# Patient Record
Sex: MALE | Race: WHITE | NOT HISPANIC OR LATINO | Employment: FULL TIME | URBAN - METROPOLITAN AREA
[De-identification: names, ages, dates, MRNs, and addresses within clinical notes are randomized per-mention and may not be internally consistent; named-entity substitution may affect disease eponyms.]

---

## 2018-01-08 ENCOUNTER — TRANSCRIBE ORDERS (OUTPATIENT)
Dept: LAB | Facility: HOSPITAL | Age: 58
End: 2018-01-08

## 2018-01-08 ENCOUNTER — APPOINTMENT (OUTPATIENT)
Dept: LAB | Facility: HOSPITAL | Age: 58
End: 2018-01-08

## 2018-01-08 DIAGNOSIS — R53.83 OTHER FATIGUE: Primary | ICD-10-CM

## 2018-01-08 DIAGNOSIS — J11.1 FLU: ICD-10-CM

## 2018-01-08 LAB
FLUAV SUBTYP SPEC NAA+PROBE: DETECTED
FLUBV RNA ISLT QL NAA+PROBE: NOT DETECTED

## 2018-01-08 PROCEDURE — 87502 INFLUENZA DNA AMP PROBE: CPT | Performed by: PHYSICIAN ASSISTANT

## 2018-07-02 ENCOUNTER — OFFICE VISIT (OUTPATIENT)
Dept: FAMILY MEDICINE CLINIC | Facility: CLINIC | Age: 58
End: 2018-07-02

## 2018-07-02 VITALS
SYSTOLIC BLOOD PRESSURE: 128 MMHG | RESPIRATION RATE: 18 BRPM | HEIGHT: 74 IN | WEIGHT: 186.4 LBS | HEART RATE: 78 BPM | BODY MASS INDEX: 23.92 KG/M2 | DIASTOLIC BLOOD PRESSURE: 72 MMHG | OXYGEN SATURATION: 98 % | TEMPERATURE: 97.7 F

## 2018-07-02 DIAGNOSIS — Z86.39 H/O DIABETES MELLITUS: ICD-10-CM

## 2018-07-02 DIAGNOSIS — I10 ESSENTIAL HYPERTENSION: Primary | ICD-10-CM

## 2018-07-02 LAB
GLUCOSE BLDC GLUCOMTR-MCNC: 156 MG/DL (ref 70–130)
HBA1C MFR BLD: 5.6 %

## 2018-07-02 PROCEDURE — 82962 GLUCOSE BLOOD TEST: CPT | Performed by: FAMILY MEDICINE

## 2018-07-02 PROCEDURE — 99203 OFFICE O/P NEW LOW 30 MIN: CPT | Performed by: FAMILY MEDICINE

## 2018-07-02 PROCEDURE — 83036 HEMOGLOBIN GLYCOSYLATED A1C: CPT | Performed by: FAMILY MEDICINE

## 2018-07-02 RX ORDER — MELATONIN
1000 DAILY
COMMUNITY

## 2018-07-02 NOTE — PROGRESS NOTES
Riaz Luevano presents today to establish care.    Chief Complaint   Patient presents with   • Establish Care        HPI     HYPERTENSION:  Took BP meds for awhile  over a year ago and it came down. Stressful day so far. Amlodipine in past. No HA, CP, vision changes.     Diabetes:  Diagnosed 1 year ago. At physical A1c 6.7. Repeat 5.7 6 months later. Rarely eats white potatoes, limits breads mainly whoel grains, not a lot of corn and starchy vegetables. Cut back on sweets and desserts. Started walking, but not much lately. Adopted as infant, found birth mother known h/o diabetes.    Saw dermatology For skin check.  He had a place frozen on his head.  He is also been prescribed a cream to help with other lesions on his face.    Review of Systems   Constitutional: Negative.    HENT: Negative.    Eyes: Negative.  Negative for visual disturbance.   Respiratory: Negative.    Cardiovascular: Negative.  Negative for chest pain.   Gastrointestinal: Negative.    Endocrine: Negative.    Genitourinary: Negative.    Musculoskeletal: Positive for arthralgias and joint swelling.   Skin: Negative.    Neurological: Negative.    Hematological: Negative.    Psychiatric/Behavioral: Negative.         Past Medical History:   Diagnosis Date   • Arthritis    • Diabetes mellitus    • Fractures     ankle   • Hypertension    • Kidney stone         Past Surgical History:   Procedure Laterality Date   • ANKLE SURGERY      Right   • KIDNEY STONE SURGERY  2012        Family History   Problem Relation Age of Onset   • Adopted: Yes   • Diabetes Mother    • Diabetes Other         Social History     Social History   • Marital status:      Spouse name: N/A   • Number of children: N/A   • Years of education: N/A     Occupational History   •       Social History Main Topics   • Smoking status: Never Smoker   • Smokeless tobacco: Never Used   • Alcohol use No   • Drug use: No   • Sexual activity: Defer     Other Topics Concern   • Not  "on file     Social History Narrative   • No narrative on file        Current Outpatient Prescriptions   Medication Sig Dispense Refill   • Ascorbic Acid (VITAMIN C ADULT GUMMIES PO) Take 1 tablet by mouth Daily.     • cholecalciferol (VITAMIN D3) 1000 units tablet Take 4 Units by mouth Daily.       No current facility-administered medications for this visit.        No Known Allergies     Visit Vitals  /98 (BP Location: Left arm, Patient Position: Sitting)   Pulse 78   Temp 97.7 °F (36.5 °C) (Temporal Artery )   Resp 18   Ht 187 cm (73.62\")   Wt 84.6 kg (186 lb 6.4 oz)   SpO2 98%   BMI 24.18 kg/m²      Visit Vitals  /72   Pulse 78   Temp 97.7 °F (36.5 °C) (Temporal Artery )   Resp 18   Ht 187 cm (73.62\")   Wt 84.6 kg (186 lb 6.4 oz)   SpO2 98%   BMI 24.18 kg/m²         Physical Exam   Constitutional: He is oriented to person, place, and time. No distress.   HENT:   Right Ear: Hearing, tympanic membrane and ear canal normal.   Left Ear: Hearing, tympanic membrane and ear canal normal.   Mouth/Throat: Oropharynx is clear and moist.   Eyes:   Wears glasses   Neck: No thyromegaly present.   Cardiovascular: Normal rate, regular rhythm and intact distal pulses.    No murmur heard.  Pulmonary/Chest: Effort normal and breath sounds normal.   Abdominal: Soft. Bowel sounds are normal. There is no hepatosplenomegaly. There is no tenderness.   Musculoskeletal: He exhibits no edema.   Lymphadenopathy:     He has no cervical adenopathy.   Neurological: He is alert and oriented to person, place, and time.   Skin: Skin is warm and dry. No rash noted.   Cherry angiomas torso   Psychiatric: He has a normal mood and affect. His behavior is normal. Judgment and thought content normal.   Vitals reviewed.     Results for orders placed or performed in visit on 07/02/18   POCT Glucose   Result Value Ref Range    Glucose 156 (A) 70 - 130 mg/dL   POC Glycosylated Hemoglobin (Hb A1C)   Result Value Ref Range    Hemoglobin A1C 5.6 % "            Riaz was seen today for establish care.    Diagnoses and all orders for this visit:      Essential hypertension  Blood pressure stable on recheck.  No need for medications at this time.  H/O diabetes mellitus  -     POCT Glucose  -     POC Glycosylated Hemoglobin (Hb A1C)  Continue diet and lifestyle modification.  No need for medications for diabetes at this time.

## 2018-07-18 ENCOUNTER — LAB (OUTPATIENT)
Dept: LAB | Facility: HOSPITAL | Age: 58
End: 2018-07-18

## 2018-07-18 ENCOUNTER — OFFICE VISIT (OUTPATIENT)
Dept: FAMILY MEDICINE CLINIC | Facility: CLINIC | Age: 58
End: 2018-07-18

## 2018-07-18 VITALS
OXYGEN SATURATION: 96 % | SYSTOLIC BLOOD PRESSURE: 108 MMHG | RESPIRATION RATE: 18 BRPM | DIASTOLIC BLOOD PRESSURE: 60 MMHG | HEART RATE: 80 BPM | TEMPERATURE: 97.7 F | BODY MASS INDEX: 24 KG/M2 | WEIGHT: 187 LBS | HEIGHT: 74 IN

## 2018-07-18 DIAGNOSIS — Z13.220 SCREENING, LIPID: ICD-10-CM

## 2018-07-18 DIAGNOSIS — Z12.5 SCREENING FOR PROSTATE CANCER: ICD-10-CM

## 2018-07-18 DIAGNOSIS — Z86.39 H/O DIABETES MELLITUS: ICD-10-CM

## 2018-07-18 DIAGNOSIS — Z13.0 SCREENING, ANEMIA, DEFICIENCY, IRON: ICD-10-CM

## 2018-07-18 DIAGNOSIS — Z00.00 WELL ADULT EXAM: Primary | ICD-10-CM

## 2018-07-18 DIAGNOSIS — Z13.29 SCREENING FOR THYROID DISORDER: ICD-10-CM

## 2018-07-18 DIAGNOSIS — Z02.4 ENCOUNTER FOR CDL (COMMERCIAL DRIVING LICENSE) EXAM: ICD-10-CM

## 2018-07-18 DIAGNOSIS — Z00.00 WELL ADULT EXAM: ICD-10-CM

## 2018-07-18 LAB
ALBUMIN SERPL-MCNC: 4.47 G/DL (ref 3.2–4.8)
ALBUMIN/GLOB SERPL: 1.5 G/DL (ref 1.5–2.5)
ALP SERPL-CCNC: 67 U/L (ref 25–100)
ALT SERPL W P-5'-P-CCNC: 19 U/L (ref 7–40)
ANION GAP SERPL CALCULATED.3IONS-SCNC: 6 MMOL/L (ref 3–11)
ARTICHOKE IGE QN: 125 MG/DL (ref 0–130)
AST SERPL-CCNC: 16 U/L (ref 0–33)
BASOPHILS # BLD AUTO: 0.02 10*3/MM3 (ref 0–0.2)
BASOPHILS NFR BLD AUTO: 0.4 % (ref 0–1)
BILIRUB BLD-MCNC: NEGATIVE MG/DL
BILIRUB SERPL-MCNC: 0.6 MG/DL (ref 0.3–1.2)
BUN BLD-MCNC: 15 MG/DL (ref 9–23)
BUN/CREAT SERPL: 14.7 (ref 7–25)
CALCIUM SPEC-SCNC: 8.9 MG/DL (ref 8.7–10.4)
CHLORIDE SERPL-SCNC: 106 MMOL/L (ref 99–109)
CHOLEST SERPL-MCNC: 168 MG/DL (ref 0–200)
CLARITY, POC: CLEAR
CO2 SERPL-SCNC: 29 MMOL/L (ref 20–31)
COLOR UR: YELLOW
CREAT BLD-MCNC: 1.02 MG/DL (ref 0.6–1.3)
DEPRECATED RDW RBC AUTO: 40.4 FL (ref 37–54)
EOSINOPHIL # BLD AUTO: 0.47 10*3/MM3 (ref 0–0.3)
EOSINOPHIL NFR BLD AUTO: 8.4 % (ref 0–3)
ERYTHROCYTE [DISTWIDTH] IN BLOOD BY AUTOMATED COUNT: 12.4 % (ref 11.3–14.5)
GFR SERPL CREATININE-BSD FRML MDRD: 75 ML/MIN/1.73
GLOBULIN UR ELPH-MCNC: 3 GM/DL
GLUCOSE BLD-MCNC: 101 MG/DL (ref 70–100)
GLUCOSE UR STRIP-MCNC: NEGATIVE MG/DL
HCT VFR BLD AUTO: 41.9 % (ref 38.9–50.9)
HDLC SERPL-MCNC: 35 MG/DL (ref 40–60)
HGB BLD-MCNC: 14.5 G/DL (ref 13.1–17.5)
IMM GRANULOCYTES # BLD: 0.02 10*3/MM3 (ref 0–0.03)
IMM GRANULOCYTES NFR BLD: 0.4 % (ref 0–0.6)
KETONES UR QL: NEGATIVE
LEUKOCYTE EST, POC: NEGATIVE
LYMPHOCYTES # BLD AUTO: 1.63 10*3/MM3 (ref 0.6–4.8)
LYMPHOCYTES NFR BLD AUTO: 29.1 % (ref 24–44)
MCH RBC QN AUTO: 31.2 PG (ref 27–31)
MCHC RBC AUTO-ENTMCNC: 34.6 G/DL (ref 32–36)
MCV RBC AUTO: 90.1 FL (ref 80–99)
MONOCYTES # BLD AUTO: 0.38 10*3/MM3 (ref 0–1)
MONOCYTES NFR BLD AUTO: 6.8 % (ref 0–12)
NEUTROPHILS # BLD AUTO: 3.1 10*3/MM3 (ref 1.5–8.3)
NEUTROPHILS NFR BLD AUTO: 55.3 % (ref 41–71)
NITRITE UR-MCNC: NEGATIVE MG/ML
PH UR: 6 [PH] (ref 5–8)
PLATELET # BLD AUTO: 179 10*3/MM3 (ref 150–450)
PMV BLD AUTO: 10.3 FL (ref 6–12)
POTASSIUM BLD-SCNC: 4.3 MMOL/L (ref 3.5–5.5)
PROT SERPL-MCNC: 7.5 G/DL (ref 5.7–8.2)
PROT UR STRIP-MCNC: NEGATIVE MG/DL
PSA SERPL-MCNC: 0.77 NG/ML (ref 0–4)
RBC # BLD AUTO: 4.65 10*6/MM3 (ref 4.2–5.76)
RBC # UR STRIP: NEGATIVE /UL
SODIUM BLD-SCNC: 141 MMOL/L (ref 132–146)
SP GR UR: 1.03 (ref 1–1.03)
TRIGL SERPL-MCNC: 115 MG/DL (ref 0–150)
TSH SERPL DL<=0.05 MIU/L-ACNC: 2.12 MIU/ML (ref 0.35–5.35)
UROBILINOGEN UR QL: NORMAL
WBC NRBC COR # BLD: 5.6 10*3/MM3 (ref 3.5–10.8)

## 2018-07-18 PROCEDURE — 84443 ASSAY THYROID STIM HORMONE: CPT

## 2018-07-18 PROCEDURE — G0103 PSA SCREENING: HCPCS

## 2018-07-18 PROCEDURE — 81003 URINALYSIS AUTO W/O SCOPE: CPT | Performed by: FAMILY MEDICINE

## 2018-07-18 PROCEDURE — 80053 COMPREHEN METABOLIC PANEL: CPT

## 2018-07-18 PROCEDURE — 80061 LIPID PANEL: CPT

## 2018-07-18 PROCEDURE — 99396 PREV VISIT EST AGE 40-64: CPT | Performed by: FAMILY MEDICINE

## 2018-07-18 PROCEDURE — 36415 COLL VENOUS BLD VENIPUNCTURE: CPT

## 2018-07-18 PROCEDURE — 85025 COMPLETE CBC W/AUTO DIFF WBC: CPT

## 2018-07-18 NOTE — PATIENT INSTRUCTIONS
Shingrix is a 2- shot vaccine to prevent shingles (herpes zoster) for adults 50 years and older. It is up to 90% effective. It is given as a 2-dose series, with the second shot administered 2 to 6 months after the first shot. Pain, redness, and swelling at the injection site, muscle pain, tiredness, headache, shivering, fever, and upset stomach are all common side effects of Shingrix. Even if you have previously received another shingles vaccine (Zostavax) it is recommended to get Shingrix. Please go to a local pharmacy to get vaccinated.

## 2018-07-18 NOTE — PROGRESS NOTES
Riaz Luevano presents today to physical    Chief Complaint   Patient presents with   • Annual Exam        HPI     Immunizations: tetanus given at last PCP office. No Shingles vaccine.    Cancer screening: prior colonoscopies. Started screening in his 40s. Diverticulosis and last time had a polyp removed. No change to bowel habits or blood in stool.     Prior PSA labs normal. Some days slow stream and dribbling in the morning, other days fine. No nocturia. No straining.     No unexplained cough or shortness of breath. Never smoker. Grew up around second hand smoke.     H/o high cholesterol but overall number not that high. Lost weight not eating high starch foods, pasta, white potatoes. Fried chicken favorite meal about once a week. Eats more chicken than red meat, last night ate burgers.     Not very good exercise habits, wants to get back walking.     3am wakes up and mind thinking about different things. Up for a little while, lays there and falls back asleep. Average bedtime 10:30 pm and out of bed 6:30. Non-restorative sleep. Not tried OTC treatments. Denies excessive daytime sleepiness.     Regular dental check-ups. Brush teeth once a day.    Even keel mood. Not overly angry. Sometimes anxiety at night.     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       Review of Systems   Constitutional: Negative for unexpected weight loss.   HENT: Negative for hearing loss.    Eyes: Negative.    Respiratory: Negative for cough and shortness of breath.    Cardiovascular: Negative.    Gastrointestinal: Negative.    Genitourinary: Negative.  Negative for decreased urine volume, difficulty urinating and nocturia.   Musculoskeletal: Positive for neck pain. Negative for back pain.   Neurological: Negative for dizziness, syncope, numbness, headache and memory problem.   Hematological: Does not bruise/bleed easily.   Psychiatric/Behavioral: Positive for sleep disturbance. Negative for depressed mood. The patient is  "nervous/anxious.         Past Medical History:   Diagnosis Date   • Arthritis    • Colon polyp    • Diabetes mellitus (CMS/HCC)    • Diverticulosis    • Fractures     ankle   • Hyperlipidemia    • Hypertension    • Kidney stone         Past Surgical History:   Procedure Laterality Date   • ANKLE SURGERY      Right   • KIDNEY STONE SURGERY  2012        Family History   Problem Relation Age of Onset   • Adopted: Yes   • Diabetes Mother    • Diabetes Other         Social History     Social History   • Marital status:      Spouse name: N/A   • Number of children: N/A   • Years of education: N/A     Occupational History   •       Social History Main Topics   • Smoking status: Never Smoker   • Smokeless tobacco: Never Used   • Alcohol use No   • Drug use: No   • Sexual activity: Defer     Other Topics Concern   • Not on file     Social History Narrative   • No narrative on file        Current Outpatient Prescriptions   Medication Sig Dispense Refill   • Ascorbic Acid (VITAMIN C ADULT GUMMIES PO) Take 1 tablet by mouth Daily.     • cholecalciferol (VITAMIN D3) 1000 units tablet Take 4 Units by mouth Daily.       No current facility-administered medications for this visit.        No Known Allergies     Visit Vitals  /60 (BP Location: Right arm, Patient Position: Sitting)   Pulse 80   Temp 97.7 °F (36.5 °C) (Temporal Artery )   Resp 18   Ht 187.6 cm (73.86\")   Wt 84.8 kg (187 lb)   SpO2 96%   BMI 24.10 kg/m²        Physical Exam   Constitutional: He is oriented to person, place, and time. No distress.   HENT:   Right Ear: Hearing and tympanic membrane normal.   Left Ear: Hearing and tympanic membrane normal.   Mouth/Throat: Oropharynx is clear and moist.   Eyes: Pupils are equal, round, and reactive to light. EOM are normal.   Horizontal Field of Vision approximately 120 degrees.   Able to recognize and distinguish red, green, and baldo colors.   No monocular vision.   Wears glasses.   Neck: Normal " range of motion. Neck supple. No thyromegaly present.   Cardiovascular: Normal rate, regular rhythm and intact distal pulses.    No murmur heard.  No bruits.    Pulmonary/Chest: Effort normal and breath sounds normal.   Abdominal: Soft. Bowel sounds are normal. There is no hepatosplenomegaly.   Genitourinary:   Genitourinary Comments: Normal, no hernias.    Musculoskeletal:        Cervical back: He exhibits normal range of motion.        Lumbar back: He exhibits normal range of motion and no tenderness.        Right hand: Normal strength noted.        Left hand: Normal strength noted.   Neurological: He is alert and oriented to person, place, and time. He has normal reflexes. He displays a negative Romberg sign. Gait normal.   Skin: Skin is warm and dry. No rash noted.   Psychiatric: He has a normal mood and affect. His behavior is normal. Judgment and thought content normal.   Vitals reviewed.     Hearing Screening    Method: Audiometry    125Hz 250Hz 500Hz 1000Hz 2000Hz 3000Hz 4000Hz 6000Hz 8000Hz   Right ear:   30 30 30       Left ear:   30 30 30          Visual Acuity Screening    Right eye Left eye Both eyes   Without correction: 20/50 20/50 20/40   With correction: 20/30 20/25 20/15         Results for orders placed or performed in visit on 07/18/18   POC Urinalysis Dipstick, Automated   Result Value Ref Range    Color Yellow Yellow, Straw, Dark Yellow, Laly    Clarity, UA Clear Clear    Specific Gravity  1.030 1.005 - 1.030    pH, Urine 6.0 5.0 - 8.0    Leukocytes Negative Negative    Nitrite, UA Negative Negative    Protein, POC Negative Negative mg/dL    Glucose, UA Negative Negative, 1000 mg/dL (3+) mg/dL    Ketones, UA Negative Negative    Urobilinogen, UA Normal Normal    Bilirubin Negative Negative    Blood, UA Negative Negative       Riaz was seen today for annual exam.    Diagnoses and all orders for this visit:    Well adult exam  -     Comprehensive Metabolic Panel; Future  -     CBC &  Differential; Future  -     Lipid Panel; Future  -     TSH Rfx On Abnormal To Free T4; Future  -     PSA SCREENING; Future  Still waiting on records from prior PCP.  Patient reports tetanus Unknown Time.  Recommended Shingles Vaccine at Local Pharmacy.  Check screening labs today. Discussed hepatitis C testing, he declined.  Request prior colonoscopy records.  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  See scanned  forms.

## 2019-07-22 ENCOUNTER — APPOINTMENT (OUTPATIENT)
Dept: LAB | Facility: HOSPITAL | Age: 59
End: 2019-07-22

## 2019-07-22 ENCOUNTER — OFFICE VISIT (OUTPATIENT)
Dept: FAMILY MEDICINE CLINIC | Facility: CLINIC | Age: 59
End: 2019-07-22

## 2019-07-22 VITALS
SYSTOLIC BLOOD PRESSURE: 112 MMHG | WEIGHT: 190 LBS | DIASTOLIC BLOOD PRESSURE: 64 MMHG | OXYGEN SATURATION: 98 % | BODY MASS INDEX: 24.38 KG/M2 | HEART RATE: 90 BPM | HEIGHT: 74 IN

## 2019-07-22 DIAGNOSIS — Z13.0 SCREENING FOR DEFICIENCY ANEMIA: ICD-10-CM

## 2019-07-22 DIAGNOSIS — Z00.00 WELL ADULT EXAM: Primary | ICD-10-CM

## 2019-07-22 DIAGNOSIS — T75.3XXA MOTION SICKNESS, INITIAL ENCOUNTER: ICD-10-CM

## 2019-07-22 DIAGNOSIS — R49.9 CHANGE IN VOICE: ICD-10-CM

## 2019-07-22 DIAGNOSIS — Z86.39 H/O DIABETES MELLITUS: ICD-10-CM

## 2019-07-22 DIAGNOSIS — Z13.29 SCREENING FOR THYROID DISORDER: ICD-10-CM

## 2019-07-22 DIAGNOSIS — E55.9 VITAMIN D DEFICIENCY: ICD-10-CM

## 2019-07-22 DIAGNOSIS — I45.10 RIGHT BUNDLE BRANCH BLOCK (RBBB): ICD-10-CM

## 2019-07-22 DIAGNOSIS — Z13.220 SCREENING, LIPID: ICD-10-CM

## 2019-07-22 DIAGNOSIS — R00.2 PALPITATIONS: ICD-10-CM

## 2019-07-22 DIAGNOSIS — Z12.5 SCREENING FOR MALIGNANT NEOPLASM OF PROSTATE: ICD-10-CM

## 2019-07-22 LAB
25(OH)D3 SERPL-MCNC: 30.1 NG/ML (ref 30–100)
ALBUMIN SERPL-MCNC: 4.5 G/DL (ref 3.5–5.2)
ALBUMIN/GLOB SERPL: 1.5 G/DL
ALP SERPL-CCNC: 55 U/L (ref 39–117)
ALT SERPL W P-5'-P-CCNC: 22 U/L (ref 1–41)
ANION GAP SERPL CALCULATED.3IONS-SCNC: 13.1 MMOL/L (ref 5–15)
AST SERPL-CCNC: 20 U/L (ref 1–40)
BASOPHILS # BLD AUTO: 0.03 10*3/MM3 (ref 0–0.2)
BASOPHILS NFR BLD AUTO: 0.6 % (ref 0–1.5)
BILIRUB SERPL-MCNC: 0.6 MG/DL (ref 0.2–1.2)
BUN BLD-MCNC: 16 MG/DL (ref 6–20)
BUN/CREAT SERPL: 16 (ref 7–25)
CALCIUM SPEC-SCNC: 9.1 MG/DL (ref 8.6–10.5)
CHLORIDE SERPL-SCNC: 104 MMOL/L (ref 98–107)
CHOLEST SERPL-MCNC: 170 MG/DL (ref 0–200)
CO2 SERPL-SCNC: 25.9 MMOL/L (ref 22–29)
CREAT BLD-MCNC: 1 MG/DL (ref 0.76–1.27)
DEPRECATED RDW RBC AUTO: 43.3 FL (ref 37–54)
EOSINOPHIL # BLD AUTO: 0.38 10*3/MM3 (ref 0–0.4)
EOSINOPHIL NFR BLD AUTO: 7.8 % (ref 0.3–6.2)
ERYTHROCYTE [DISTWIDTH] IN BLOOD BY AUTOMATED COUNT: 12.5 % (ref 12.3–15.4)
GFR SERPL CREATININE-BSD FRML MDRD: 76 ML/MIN/1.73
GLOBULIN UR ELPH-MCNC: 3.1 GM/DL
GLUCOSE BLD-MCNC: 108 MG/DL (ref 65–99)
HBA1C MFR BLD: 6.15 % (ref 4.8–5.6)
HCT VFR BLD AUTO: 45.8 % (ref 37.5–51)
HDLC SERPL-MCNC: 34 MG/DL (ref 40–60)
HGB BLD-MCNC: 14.9 G/DL (ref 13–17.7)
IMM GRANULOCYTES # BLD AUTO: 0.04 10*3/MM3 (ref 0–0.05)
IMM GRANULOCYTES NFR BLD AUTO: 0.8 % (ref 0–0.5)
LDLC SERPL CALC-MCNC: 117 MG/DL (ref 0–100)
LDLC/HDLC SERPL: 3.44 {RATIO}
LYMPHOCYTES # BLD AUTO: 1.23 10*3/MM3 (ref 0.7–3.1)
LYMPHOCYTES NFR BLD AUTO: 25.4 % (ref 19.6–45.3)
MCH RBC QN AUTO: 30.8 PG (ref 26.6–33)
MCHC RBC AUTO-ENTMCNC: 32.5 G/DL (ref 31.5–35.7)
MCV RBC AUTO: 94.8 FL (ref 79–97)
MONOCYTES # BLD AUTO: 0.4 10*3/MM3 (ref 0.1–0.9)
MONOCYTES NFR BLD AUTO: 8.2 % (ref 5–12)
NEUTROPHILS # BLD AUTO: 2.77 10*3/MM3 (ref 1.7–7)
NEUTROPHILS NFR BLD AUTO: 57.2 % (ref 42.7–76)
NRBC BLD AUTO-RTO: 0 /100 WBC (ref 0–0.2)
PLATELET # BLD AUTO: 189 10*3/MM3 (ref 140–450)
PMV BLD AUTO: 10.3 FL (ref 6–12)
POTASSIUM BLD-SCNC: 4.6 MMOL/L (ref 3.5–5.2)
PROT SERPL-MCNC: 7.6 G/DL (ref 6–8.5)
PSA SERPL-MCNC: 1.02 NG/ML (ref 0–4)
RBC # BLD AUTO: 4.83 10*6/MM3 (ref 4.14–5.8)
SODIUM BLD-SCNC: 143 MMOL/L (ref 136–145)
TRIGL SERPL-MCNC: 95 MG/DL (ref 0–150)
TSH SERPL DL<=0.05 MIU/L-ACNC: 2.71 MIU/ML (ref 0.27–4.2)
VLDLC SERPL-MCNC: 19 MG/DL (ref 5–40)
WBC NRBC COR # BLD: 4.85 10*3/MM3 (ref 3.4–10.8)

## 2019-07-22 PROCEDURE — 82306 VITAMIN D 25 HYDROXY: CPT | Performed by: FAMILY MEDICINE

## 2019-07-22 PROCEDURE — 83036 HEMOGLOBIN GLYCOSYLATED A1C: CPT | Performed by: FAMILY MEDICINE

## 2019-07-22 PROCEDURE — 80061 LIPID PANEL: CPT | Performed by: FAMILY MEDICINE

## 2019-07-22 PROCEDURE — G0103 PSA SCREENING: HCPCS | Performed by: FAMILY MEDICINE

## 2019-07-22 PROCEDURE — 99396 PREV VISIT EST AGE 40-64: CPT | Performed by: FAMILY MEDICINE

## 2019-07-22 PROCEDURE — 84443 ASSAY THYROID STIM HORMONE: CPT | Performed by: FAMILY MEDICINE

## 2019-07-22 PROCEDURE — 85025 COMPLETE CBC W/AUTO DIFF WBC: CPT | Performed by: FAMILY MEDICINE

## 2019-07-22 PROCEDURE — 93000 ELECTROCARDIOGRAM COMPLETE: CPT | Performed by: FAMILY MEDICINE

## 2019-07-22 PROCEDURE — 80053 COMPREHEN METABOLIC PANEL: CPT | Performed by: FAMILY MEDICINE

## 2019-07-22 RX ORDER — SCOLOPAMINE TRANSDERMAL SYSTEM 1 MG/1
1 PATCH, EXTENDED RELEASE TRANSDERMAL
Qty: 3 PATCH | Refills: 0 | Status: SHIPPED | OUTPATIENT
Start: 2019-07-22 | End: 2019-09-26

## 2019-07-22 RX ORDER — ONDANSETRON 4 MG/1
4 TABLET, FILM COATED ORAL EVERY 8 HOURS PRN
Qty: 10 TABLET | Refills: 0 | Status: SHIPPED | OUTPATIENT
Start: 2019-07-22 | End: 2020-02-20

## 2019-07-22 NOTE — PROGRESS NOTES
Riaz Luevano presents today for a physical    Chief Complaint   Patient presents with   • Annual Exam   • Arthritis        HPI       Diet is regular. Tries to avoid starches, but likes bread. Avoid white potatoes.     Physical activity includes hiking a couple weekends ago.     Joint pain in hands. Swelling while hiking. Not using OTC analgesics.     Sleep problems wakes up at 3am with his mind. Average 7-8 hours per night.     No mood problems.   PHQ-2/PHQ-9 Depression Screening 7/22/2019   Little interest or pleasure in doing things 0   Feeling down, depressed, or hopeless 0   Total Score 0     Earlier this year heart seemed to go crazy, flutters. Son had heart problems. No episodes in several months.     Going on a cruise, experienced motion sickness with honeymoon.     Voice gets weak at times. No pain. Present for years. No known aggravating factors. Used to do music ministry.     Review of Systems   HENT: Positive for hearing loss, tinnitus and voice change. Negative for sore throat.    Cardiovascular: Positive for palpitations.   Musculoskeletal: Positive for arthralgias.   Psychiatric/Behavioral: Positive for sleep disturbance. Negative for depressed mood.        Health Maintenance   Topic Date Due   • TDAP/TD VACCINES (1 - Tdap) 10/14/1978   • ZOSTER VACCINE (1 of 2) 10/14/2009   • LIPID PANEL  07/18/2019   • INFLUENZA VACCINE  08/01/2019   • COLONOSCOPY  06/08/2027       Past Medical History:   Diagnosis Date   • Allergic rhinitis    • Arthritis    • BPH (benign prostatic hyperplasia)    • Colon polyp 06/08/2017    tubular adenoma   • Degenerative joint disease    • Diabetes mellitus (CMS/HCC)    • Diverticulosis    • Elevated liver enzymes 2017    fibrosure high   • Fractures     ankle   • GERD (gastroesophageal reflux disease)    • Hereditary hemochromatosis (CMS/HCC) 05/16/2017    carrier   • Hyperlipidemia    • Hypertension    • Kidney stone    • Proctalgia fugax    • Pruritus ani    • Rotator cuff  "disorder 01/09/2014   • Testicular hypofunction    • Vitamin D deficiency         Past Surgical History:   Procedure Laterality Date   • ANKLE SURGERY      Right   • KIDNEY STONE SURGERY  2012        Family History   Adopted: Yes   Problem Relation Age of Onset   • Diabetes Mother    • Diabetes Other         Social History     Socioeconomic History   • Marital status:      Spouse name: Not on file   • Number of children: Not on file   • Years of education: Not on file   • Highest education level: Not on file   Occupational History   • Occupation:    Tobacco Use   • Smoking status: Never Smoker   • Smokeless tobacco: Never Used   Substance and Sexual Activity   • Alcohol use: No   • Drug use: No   • Sexual activity: Defer        Current Outpatient Medications on File Prior to Visit   Medication Sig Dispense Refill   • Ascorbic Acid (VITAMIN C ADULT GUMMIES PO) Take 1 tablet by mouth Daily.     • cholecalciferol (VITAMIN D3) 1000 units tablet Take 4 Units by mouth Daily.       No current facility-administered medications on file prior to visit.        No Known Allergies     Visit Vitals  /80   Pulse 90   Ht 187.5 cm (73.8\")   Wt 86.2 kg (190 lb)   SpO2 98%   BMI 24.53 kg/m²     Visit Vitals  /64   Pulse 90   Ht 187.5 cm (73.8\")   Wt 86.2 kg (190 lb)   SpO2 98%   BMI 24.53 kg/m²          Physical Exam   Constitutional: He is oriented to person, place, and time. No distress.   HENT:   Left Ear: Tympanic membrane and ear canal normal.   Nose: Nose normal.   Mouth/Throat: Oropharynx is clear and moist. No oral lesions.   Right ear canal excessive cerumen   Eyes: Conjunctivae are normal. Pupils are equal, round, and reactive to light.   Neck: Neck supple. No thyromegaly present.   Cardiovascular: Normal rate, regular rhythm and intact distal pulses.   No murmur heard.  Pulses:       Posterior tibial pulses are 2+ on the right side, and 2+ on the left side.   Pulmonary/Chest: Effort normal and " breath sounds normal.   Abdominal: Soft. There is no hepatosplenomegaly. There is no tenderness.   Musculoskeletal: He exhibits no edema.   Lymphadenopathy:     He has no cervical adenopathy.   Neurological: He is alert and oriented to person, place, and time.   Skin: Skin is warm and dry. No rash noted.   Psychiatric: He has a normal mood and affect.   Vitals reviewed.       Results for orders placed or performed in visit on 07/18/18   Comprehensive Metabolic Panel   Result Value Ref Range    Glucose 101 (H) 70 - 100 mg/dL    BUN 15 9 - 23 mg/dL    Creatinine 1.02 0.60 - 1.30 mg/dL    Sodium 141 132 - 146 mmol/L    Potassium 4.3 3.5 - 5.5 mmol/L    Chloride 106 99 - 109 mmol/L    CO2 29.0 20.0 - 31.0 mmol/L    Calcium 8.9 8.7 - 10.4 mg/dL    Total Protein 7.5 5.7 - 8.2 g/dL    Albumin 4.47 3.20 - 4.80 g/dL    ALT (SGPT) 19 7 - 40 U/L    AST (SGOT) 16 0 - 33 U/L    Alkaline Phosphatase 67 25 - 100 U/L    Total Bilirubin 0.6 0.3 - 1.2 mg/dL    eGFR Non African Amer 75 >60 mL/min/1.73    Globulin 3.0 gm/dL    A/G Ratio 1.5 1.5 - 2.5 g/dL    BUN/Creatinine Ratio 14.7 7.0 - 25.0    Anion Gap 6.0 3.0 - 11.0 mmol/L   Lipid Panel   Result Value Ref Range    Total Cholesterol 168 0 - 200 mg/dL    Triglycerides 115 0 - 150 mg/dL    HDL Cholesterol 35 (L) 40 - 60 mg/dL    LDL Cholesterol  125 0 - 130 mg/dL   TSH Rfx On Abnormal To Free T4   Result Value Ref Range    TSH 2.115 0.350 - 5.350 mIU/mL   PSA SCREENING   Result Value Ref Range    PSA 0.770 0.000 - 4.000 ng/mL   CBC Auto Differential   Result Value Ref Range    WBC 5.60 3.50 - 10.80 10*3/mm3    RBC 4.65 4.20 - 5.76 10*6/mm3    Hemoglobin 14.5 13.1 - 17.5 g/dL    Hematocrit 41.9 38.9 - 50.9 %    MCV 90.1 80.0 - 99.0 fL    MCH 31.2 (H) 27.0 - 31.0 pg    MCHC 34.6 32.0 - 36.0 g/dL    RDW 12.4 11.3 - 14.5 %    RDW-SD 40.4 37.0 - 54.0 fl    MPV 10.3 6.0 - 12.0 fL    Platelets 179 150 - 450 10*3/mm3    Neutrophil % 55.3 41.0 - 71.0 %    Lymphocyte % 29.1 24.0 - 44.0 %     Monocyte % 6.8 0.0 - 12.0 %    Eosinophil % 8.4 (H) 0.0 - 3.0 %    Basophil % 0.4 0.0 - 1.0 %    Immature Grans % 0.4 0.0 - 0.6 %    Neutrophils, Absolute 3.10 1.50 - 8.30 10*3/mm3    Lymphocytes, Absolute 1.63 0.60 - 4.80 10*3/mm3    Monocytes, Absolute 0.38 0.00 - 1.00 10*3/mm3    Eosinophils, Absolute 0.47 (H) 0.00 - 0.30 10*3/mm3    Basophils, Absolute 0.02 0.00 - 0.20 10*3/mm3    Immature Grans, Absolute 0.02 0.00 - 0.03 10*3/mm3        ECG 12 Lead  Date/Time: 7/22/2019 8:33 AM  Performed by: Sunita Wick MD  Authorized by: Sunita Wick MD   Comparison: not compared with previous ECG   Previous ECG: no previous ECG available  Rhythm: sinus rhythm  Rate: normal  Conduction: right bundle branch block  ST Segments: ST segments normal  T Waves: T waves normal  QRS axis: normal    Clinical impression: abnormal EKG and non-specific ECG              There is no immunization history on file for this patient.    Riaz was seen today for annual exam and arthritis.    Diagnoses and all orders for this visit:    Well adult exam  -     CBC & Differential; Future  -     Comprehensive Metabolic Panel; Future  -     Lipid Panel; Future  -     TSH Rfx On Abnormal To Free T4; Future  -     PSA Screen; Future  Check fasting labs today.  Motion sickness, initial encounter  -     Scopolamine (TRANSDERM-SCOP, 1.5 MG,) 1.5 MG/3DAYS patch; Place 1 patch on the skin as directed by provider Every 72 (Seventy-Two) Hours.  -     ondansetron (ZOFRAN) 4 MG tablet; Take 1 tablet by mouth Every 8 (Eight) Hours As Needed for Nausea or Vomiting.  New.  Advised to alternate scopolamine patches.  Zofran as needed for nausea.  Palpitations  -     ECG 12 Lead  EKG with normal sinus rhythm.  Patient is currently asymptomatic.  Right bundle branch block (RBBB)  New.  No prior EKGs to compare.  No further treatment needed.  Vitamin D deficiency  -     Vitamin D 25 Hydroxy; Future  Patient is currently on replacement.   Check levels today.  Change in voice  Recommend referral to ear nose and throat for flexible laryngoscopy to view the vocal cords.  Patient will call back if he decides on referral.  H/O diabetes mellitus  -     Comprehensive Metabolic Panel; Future  -     Hemoglobin A1c; Future  Patient has history of diabetes but is not treated.  His A1c improved with dietary changes.  His physical last year showed only a mild elevated fasting glucose.  Repeat labs today.  Screening for deficiency anemia  -     CBC & Differential; Future    Screening, lipid  -     Lipid Panel; Future    Screening for thyroid disorder  -     TSH Rfx On Abnormal To Free T4; Future    Screening for malignant neoplasm of prostate  -     PSA Screen; Future        Patient's Body mass index is 24.53 kg/m². BMI is within normal parameters. No follow-up required..      Counseled on health maintenance topics: Screening for prostate cancer     Return in about 1 year (around 7/22/2020) for Physical.

## 2019-09-26 ENCOUNTER — OFFICE VISIT (OUTPATIENT)
Dept: FAMILY MEDICINE CLINIC | Facility: CLINIC | Age: 59
End: 2019-09-26

## 2019-09-26 VITALS
BODY MASS INDEX: 23.69 KG/M2 | HEART RATE: 113 BPM | HEIGHT: 74 IN | WEIGHT: 184.6 LBS | OXYGEN SATURATION: 96 % | SYSTOLIC BLOOD PRESSURE: 136 MMHG | TEMPERATURE: 98.1 F | DIASTOLIC BLOOD PRESSURE: 92 MMHG

## 2019-09-26 DIAGNOSIS — K52.9 ENTERITIS: Primary | ICD-10-CM

## 2019-09-26 DIAGNOSIS — E86.0 DEHYDRATION: ICD-10-CM

## 2019-09-26 LAB — C DIFF TOX GENS STL QL NAA+PROBE: NOT DETECTED

## 2019-09-26 PROCEDURE — 87045 FECES CULTURE AEROBIC BACT: CPT | Performed by: FAMILY MEDICINE

## 2019-09-26 PROCEDURE — 99214 OFFICE O/P EST MOD 30 MIN: CPT | Performed by: FAMILY MEDICINE

## 2019-09-26 PROCEDURE — 87046 STOOL CULTR AEROBIC BACT EA: CPT | Performed by: FAMILY MEDICINE

## 2019-09-26 PROCEDURE — 87493 C DIFF AMPLIFIED PROBE: CPT | Performed by: FAMILY MEDICINE

## 2019-09-26 NOTE — PROGRESS NOTES
Chief Complaint   Patient presents with   • Diarrhea     started on Sunday, lack of appetite, thought of food makes him nauseous. Denies any abdominal pain.        Diarrhea    This is a new problem. The current episode started in the past 7 days. The problem has been gradually worsening. The stool consistency is described as watery. Pertinent negatives include no abdominal pain, chills, fever or vomiting. There are no known risk factors. Treatments tried: OTC medication. The treatment provided no relief.      4 BM today so far, odor, cloudy and watery. Non-bloody. Rumbling in stomach. Drank some tea this morning and toast. Only a few spoons of soup. Tried OTC treatment last night.     Review of Systems   Constitutional: Positive for appetite change and fatigue. Negative for chills and fever.   Gastrointestinal: Positive for diarrhea and nausea. Negative for abdominal pain and vomiting.        Current Outpatient Medications on File Prior to Visit   Medication Sig Dispense Refill   • Ascorbic Acid (VITAMIN C ADULT GUMMIES PO) Take 1 tablet by mouth Daily.     • cholecalciferol (VITAMIN D3) 1000 units tablet Take 4 Units by mouth Daily.     • ondansetron (ZOFRAN) 4 MG tablet Take 1 tablet by mouth Every 8 (Eight) Hours As Needed for Nausea or Vomiting. 10 tablet 0   • [DISCONTINUED] Scopolamine (TRANSDERM-SCOP, 1.5 MG,) 1.5 MG/3DAYS patch Place 1 patch on the skin as directed by provider Every 72 (Seventy-Two) Hours. 3 patch 0     No current facility-administered medications on file prior to visit.        No Known Allergies    Past Medical History:   Diagnosis Date   • Allergic rhinitis    • Arthritis    • BPH (benign prostatic hyperplasia)    • Colon polyp 06/08/2017    tubular adenoma   • Degenerative joint disease    • Diabetes mellitus (CMS/HCC)    • Diverticulosis    • Elevated liver enzymes 2017    fibrosure high   • Fractures     ankle   • GERD (gastroesophageal reflux disease)    • Hereditary hemochromatosis  "(CMS/[facility]) 05/16/2017    carrier   • Hyperlipidemia    • Hypertension    • Kidney stone    • Proctalgia fugax    • Pruritus ani    • Rotator cuff disorder 01/09/2014   • Testicular hypofunction    • Vitamin D deficiency         Past Surgical History:   Procedure Laterality Date   • ANKLE SURGERY      Right   • KIDNEY STONE SURGERY  2012        Family History   Adopted: Yes   Problem Relation Age of Onset   • Diabetes Mother    • Diabetes Other         Social History     Socioeconomic History   • Marital status:      Spouse name: Not on file   • Number of children: Not on file   • Years of education: Not on file   • Highest education level: Not on file   Occupational History   • Occupation:    Tobacco Use   • Smoking status: Never Smoker   • Smokeless tobacco: Never Used   Substance and Sexual Activity   • Alcohol use: No   • Drug use: No   • Sexual activity: Defer        Visit Vitals  /92 (BP Location: Left arm, Patient Position: Sitting, Cuff Size: Adult)   Pulse 113   Temp 98.1 °F (36.7 °C) (Temporal)   Ht 187.5 cm (73.8\")   Wt 83.7 kg (184 lb 9.6 oz)   SpO2 96%   BMI 23.83 kg/m²        Physical Exam   Constitutional:  Non-toxic appearance. He appears ill. No distress.   HENT:   Mucous membranes tacky   Cardiovascular: Regular rhythm. Tachycardia present.   No murmur heard.  Pulmonary/Chest: Effort normal and breath sounds normal.   Abdominal: Soft. He exhibits distension. Bowel sounds are increased. There is no tenderness. There is no rebound and no guarding.   Tympanic to percussion   Skin: Skin is warm and dry.   Delayed skin turgor   Psychiatric: He has a normal mood and affect.   Vitals reviewed.           Riaz was seen today for diarrhea.    Diagnoses and all orders for this visit:    Enteritis  -     Stool Culture (Reference Lab) - Stool, Per Rectum; Future  -     Clostridium Difficile Toxin, PCR - Stool, Per Rectum; Future  -     Stool Culture (Reference Lab) - Stool, Per Rectum  - "     Clostridium Difficile Toxin, PCR - Stool, Per Rectum  New.  Recommend checking stool studies.  Defer antibiotics unless positive.  Discussed use of over-the-counter antimotility agents.  Dehydration  New. Clinically appears dehydrated.   Recommend that he take zofran even though he is not vomiting to help nausea and increase p.o. intake.  If he is not able to increase oral hydration by this evening or tomorrow I recommend going to the emergency room for IV fluids.      Return if symptoms worsen or fail to improve.    Dr. Sunita Madden

## 2019-09-30 ENCOUNTER — TELEPHONE (OUTPATIENT)
Dept: FAMILY MEDICINE CLINIC | Facility: CLINIC | Age: 59
End: 2019-09-30

## 2019-09-30 NOTE — TELEPHONE ENCOUNTER
Patient called back and was notified of lab results. He verbalized understanding and had no additional questions.

## 2019-09-30 NOTE — TELEPHONE ENCOUNTER
Result Notes for Stool Culture (Reference Lab) - Stool, Per Rectum     Notes recorded by Elizabeth Soares MA on 9/30/2019 at 9:10 AM EDT  Tried calling pt, no answer. LVM to call back.  ------    Notes recorded by Sunita Wick MD on 9/30/2019 at 8:49 AM EDT  Stool studies are negative for bacterial infection.

## 2019-10-01 LAB
CAMPYLOBACTER STL CULT: NORMAL
E COLI SXT STL QL IA: NEGATIVE
Lab: NORMAL
Lab: NORMAL
SALM + SHIG STL CULT: NORMAL

## 2020-02-20 ENCOUNTER — OFFICE VISIT (OUTPATIENT)
Dept: FAMILY MEDICINE CLINIC | Facility: CLINIC | Age: 60
End: 2020-02-20

## 2020-02-20 VITALS
HEART RATE: 83 BPM | HEIGHT: 71 IN | DIASTOLIC BLOOD PRESSURE: 82 MMHG | WEIGHT: 193.8 LBS | SYSTOLIC BLOOD PRESSURE: 122 MMHG | TEMPERATURE: 98.4 F | BODY MASS INDEX: 27.13 KG/M2

## 2020-02-20 DIAGNOSIS — J04.0 LARYNGITIS: Primary | ICD-10-CM

## 2020-02-20 DIAGNOSIS — R05.9 COUGH: ICD-10-CM

## 2020-02-20 PROCEDURE — 99213 OFFICE O/P EST LOW 20 MIN: CPT | Performed by: PHYSICIAN ASSISTANT

## 2020-02-20 RX ORDER — METHYLPREDNISOLONE 4 MG/1
TABLET ORAL
Qty: 1 EACH | Refills: 0 | Status: CANCELLED | OUTPATIENT
Start: 2020-02-20

## 2020-02-20 RX ORDER — METHYLPREDNISOLONE 4 MG/1
TABLET ORAL
Qty: 1 EACH | Refills: 0 | Status: SHIPPED | OUTPATIENT
Start: 2020-02-20 | End: 2020-07-27

## 2020-02-20 RX ORDER — CEFDINIR 300 MG/1
300 CAPSULE ORAL EVERY 12 HOURS
Qty: 20 CAPSULE | Refills: 0 | Status: SHIPPED | OUTPATIENT
Start: 2020-02-20 | End: 2020-03-01

## 2020-02-20 RX ORDER — CEFDINIR 300 MG/1
300 CAPSULE ORAL EVERY 12 HOURS
Qty: 20 CAPSULE | Refills: 0 | Status: CANCELLED | OUTPATIENT
Start: 2020-02-20 | End: 2020-03-01

## 2020-02-20 NOTE — PROGRESS NOTES
Chief Complaint   Patient presents with   • Sinusitis     x4 days       HPI      Riaz Luevano is a 59 y.o. male who presents for Sinusitis (x4 days).  Patient reports post-nasal drip, nasal drainage and head congestion for the last 4 days.  He lost his voice completely yesterday and has started coughing today.  Denies SOB, fever, chills, ear pain.  Has mild sore throat.  Started generic claritin yesterday.  No antibiotic allergies.      Past Medical History:   Diagnosis Date   • Allergic rhinitis    • Arthritis    • BPH (benign prostatic hyperplasia)    • Colon polyp 06/08/2017    tubular adenoma   • Degenerative joint disease    • Diabetes mellitus (CMS/[facility])    • Diverticulosis    • Elevated liver enzymes 2017    fibrosure high   • Fractures     ankle   • GERD (gastroesophageal reflux disease)    • Hereditary hemochromatosis (CMS/[facility]) 05/16/2017    carrier   • Hyperlipidemia    • Hypertension    • Kidney stone    • Proctalgia fugax    • Pruritus ani    • Rotator cuff disorder 01/09/2014   • Testicular hypofunction    • Vitamin D deficiency        Past Surgical History:   Procedure Laterality Date   • ANKLE SURGERY      Right   • KIDNEY STONE SURGERY  2012       Family History   Adopted: Yes   Problem Relation Age of Onset   • Diabetes Mother    • Diabetes Other        Social History     Socioeconomic History   • Marital status:      Spouse name: Not on file   • Number of children: Not on file   • Years of education: Not on file   • Highest education level: Not on file   Occupational History   • Occupation:    Tobacco Use   • Smoking status: Never Smoker   • Smokeless tobacco: Never Used   Substance and Sexual Activity   • Alcohol use: No   • Drug use: No   • Sexual activity: Defer       No Known Allergies    ROS    Review of Systems   Constitutional: Positive for fatigue. Negative for chills and fever.   HENT: Positive for congestion, postnasal drip, rhinorrhea, sore throat and voice change.  "Negative for ear pain and sinus pressure.    Respiratory: Positive for cough. Negative for shortness of breath and wheezing.    Musculoskeletal: Negative for myalgias.       Vitals:    02/20/20 1049   BP: 122/82   Pulse: 83   Temp: 98.4 °F (36.9 °C)   Weight: 87.9 kg (193 lb 12.8 oz)   Height: 180.3 cm (71\")     Body mass index is 27.03 kg/m².    Current Outpatient Medications on File Prior to Visit   Medication Sig Dispense Refill   • Ascorbic Acid (VITAMIN C ADULT GUMMIES PO) Take 1 tablet by mouth Daily.     • cholecalciferol (VITAMIN D3) 1000 units tablet Take 4 Units by mouth Daily.     • [DISCONTINUED] ondansetron (ZOFRAN) 4 MG tablet Take 1 tablet by mouth Every 8 (Eight) Hours As Needed for Nausea or Vomiting. 10 tablet 0     No current facility-administered medications on file prior to visit.        Results for orders placed or performed in visit on 09/26/19   Stool Culture (Reference Lab) - Stool, Per Rectum   Result Value Ref Range    Salmonella/Shigella Screen Final report     Result 1 Comment     Campylobacter Culture Final report     Result 1 Comment     E coli, Shiga toxin Assay Negative Negative   Clostridium Difficile Toxin, PCR - Stool, Per Rectum   Result Value Ref Range    C. Difficile Toxins by PCR Not Detected Not Detected       PE    Physical Exam   Constitutional: He is oriented to person, place, and time. Vital signs are normal. He appears well-developed and well-nourished. He appears ill. No distress.   HENT:   Head: Normocephalic and atraumatic.   Right Ear: Hearing, tympanic membrane, external ear and ear canal normal.   Left Ear: Hearing, tympanic membrane, external ear and ear canal normal.   Nose: Mucosal edema and rhinorrhea present. Right sinus exhibits no maxillary sinus tenderness and no frontal sinus tenderness. Left sinus exhibits no maxillary sinus tenderness and no frontal sinus tenderness.   Mouth/Throat: Uvula is midline. Posterior oropharyngeal erythema present.   Eyes: " Conjunctivae are normal.   Neck: Normal range of motion.   Cardiovascular: Normal rate, regular rhythm and normal heart sounds. Exam reveals no gallop and no friction rub.   No murmur heard.  Pulmonary/Chest: Effort normal and breath sounds normal. No respiratory distress.   Dry cough   Musculoskeletal: Normal range of motion.   Neurological: He is alert and oriented to person, place, and time.   Skin: Skin is warm. He is not diaphoretic. No erythema.   Psychiatric: He has a normal mood and affect. His behavior is normal. Judgment and thought content normal.   Vitals reviewed.       A/P    Riaz was seen today for sinusitis.    Diagnoses and all orders for this visit:    Laryngitis  -     methylPREDNISolone (MEDROL) 4 MG tablet; Take as directed on package instructions. Dispense: 21 Count dose jass with 0 refills.  Patient lost voice yesterday.  Recommend starting steroid pack, voice rest, salt water gargles and tea with honey.    Cough  -     cefdinir (OMNICEF) 300 MG capsule; Take 1 capsule by mouth Every 12 (Twelve) Hours for 10 days.  Most likely due to post-nasal drip.  Lungs CTA with dry cough.  Symptoms started 4 days ago and worsening.  Recommend symptomatic treatment for the next day or two and starting steroid today.  If cough worsens or symptoms do not improve, would start cefdinir.  Continue generic claritin daily.  Recommend nasal saline lavage.    Plan of care reviewed with patient at the conclusion of today's visit. Education was provided regarding diagnosis, management and any prescribed or recommended OTC medications.  Patient verbalizes understanding of and agreement with management plan.    No follow-ups on file.     Elle Valdes PA-C

## 2020-02-21 NOTE — PROGRESS NOTES
I have reviewed the notes, assessments, and/or procedures performed by SHAHBAZ Gore, I concur with her/his documentation of Riaz Luevano.

## 2020-07-27 ENCOUNTER — LAB REQUISITION (OUTPATIENT)
Dept: LAB | Facility: HOSPITAL | Age: 60
End: 2020-07-27

## 2020-07-27 ENCOUNTER — OFFICE VISIT (OUTPATIENT)
Dept: FAMILY MEDICINE CLINIC | Facility: CLINIC | Age: 60
End: 2020-07-27

## 2020-07-27 VITALS
BODY MASS INDEX: 27.02 KG/M2 | HEART RATE: 76 BPM | HEIGHT: 71 IN | TEMPERATURE: 97.5 F | OXYGEN SATURATION: 98 % | DIASTOLIC BLOOD PRESSURE: 80 MMHG | WEIGHT: 193 LBS | SYSTOLIC BLOOD PRESSURE: 120 MMHG

## 2020-07-27 DIAGNOSIS — Z13.1 SCREENING FOR DIABETES MELLITUS: ICD-10-CM

## 2020-07-27 DIAGNOSIS — Z00.00 WELL ADULT EXAM: Primary | ICD-10-CM

## 2020-07-27 DIAGNOSIS — Z12.5 SCREENING FOR MALIGNANT NEOPLASM OF PROSTATE: ICD-10-CM

## 2020-07-27 DIAGNOSIS — Z13.220 SCREENING, LIPID: ICD-10-CM

## 2020-07-27 DIAGNOSIS — R73.03 PREDIABETES: ICD-10-CM

## 2020-07-27 DIAGNOSIS — Z00.00 ROUTINE GENERAL MEDICAL EXAMINATION AT A HEALTH CARE FACILITY: ICD-10-CM

## 2020-07-27 DIAGNOSIS — Z13.0 SCREENING FOR DEFICIENCY ANEMIA: ICD-10-CM

## 2020-07-27 DIAGNOSIS — E55.9 VITAMIN D DEFICIENCY: ICD-10-CM

## 2020-07-27 PROCEDURE — 36415 COLL VENOUS BLD VENIPUNCTURE: CPT | Performed by: FAMILY MEDICINE

## 2020-07-27 PROCEDURE — 99396 PREV VISIT EST AGE 40-64: CPT | Performed by: FAMILY MEDICINE

## 2020-07-27 NOTE — PROGRESS NOTES
Riaz Luevano presents today for a physical    Chief Complaint   Patient presents with   • Annual Exam        HPI     Gained some weight with changed in eating habits. More comfort foods. He can't eat as much as he used to, feeling overstuffed. Arthritis in his hands. See chiropractor twice a month. He has limited ROM neck. Saw dermatology exam last month, had area frozen on left cheek. Around 3 am wakes up occasionally.  He notices that his stream is not as strong as it used to be.  Sometimes in the morning it takes him longer to urinate because the stream is so slow.      Review of Systems   Constitutional: Positive for unexpected weight gain. Negative for fatigue and unexpected weight loss.   HENT: Negative for congestion, hearing loss and rhinorrhea.    Eyes: Negative for visual disturbance.   Respiratory: Negative for cough, shortness of breath and wheezing.    Cardiovascular: Negative for chest pain and palpitations.   Gastrointestinal: Negative for abdominal pain, constipation, diarrhea and GERD.   Genitourinary:        No nocturia   Musculoskeletal: Positive for arthralgias, back pain and neck pain.   Skin: Negative for rash.   Neurological: Negative for dizziness and headache.   Hematological: Does not bruise/bleed easily.   Psychiatric/Behavioral: Positive for sleep disturbance. Negative for depressed mood. The patient is not nervous/anxious.         Past Medical History:   Diagnosis Date   • Allergic rhinitis    • Arthritis    • BPH (benign prostatic hyperplasia)    • Colon polyp 06/08/2017    tubular adenoma   • Degenerative joint disease    • Diabetes mellitus (CMS/HCC)    • Diverticulosis    • Elevated liver enzymes 2017    fibrosure high   • Fractures     ankle   • GERD (gastroesophageal reflux disease)    • Hereditary hemochromatosis (CMS/HCC) 05/16/2017    carrier   • Hyperlipidemia    • Hypertension    • Kidney stone    • Proctalgia fugax    • Pruritus ani    • Rotator cuff disorder 01/09/2014   •  "Testicular hypofunction    • Vitamin D deficiency         Past Surgical History:   Procedure Laterality Date   • ANKLE SURGERY      Right   • DENTAL PROCEDURE  07/08/2020   • KIDNEY STONE SURGERY  2012        Family History   Adopted: Yes   Problem Relation Age of Onset   • Diabetes Mother    • Diabetes Other         Social History     Socioeconomic History   • Marital status:      Spouse name: Not on file   • Number of children: Not on file   • Years of education: Not on file   • Highest education level: Not on file   Occupational History   • Occupation:    Tobacco Use   • Smoking status: Never Smoker   • Smokeless tobacco: Never Used   Substance and Sexual Activity   • Alcohol use: No   • Drug use: No   • Sexual activity: Defer        Current Outpatient Medications   Medication Sig Dispense Refill   • cholecalciferol (VITAMIN D3) 1000 units tablet Take 4 Units by mouth Daily.     • ELDERBERRY PO Take  by mouth.     • Multiple Vitamins-Minerals (MENS MULTIVITAMIN PO) Take  by mouth.     • Ascorbic Acid (VITAMIN C ADULT GUMMIES PO) Take 1 tablet by mouth Daily.       No current facility-administered medications for this visit.        No Known Allergies     Vitals:    07/27/20 0805   BP: 120/80   Pulse: 76   Temp: 97.5 °F (36.4 °C)   SpO2: 98%   Weight: 87.5 kg (193 lb)   Height: 180.3 cm (71\")   PainSc: 0-No pain      Body mass index is 26.92 kg/m².    Patient's Body mass index is 26.92 kg/m². BMI is above normal parameters. Recommendations include: nutrition counseling.      Physical Exam   Constitutional: He is oriented to person, place, and time. No distress.   Eyes: Right eye exhibits no discharge. Left eye exhibits no discharge.   Neck: Neck supple. No thyromegaly present.   Cardiovascular: Normal rate, regular rhythm and intact distal pulses.   No murmur heard.  Pulmonary/Chest: Effort normal and breath sounds normal.   Abdominal: Soft. There is no hepatosplenomegaly. There is no tenderness. "   Musculoskeletal: He exhibits no edema.   Lymphadenopathy:     He has no cervical adenopathy.   Neurological: He is alert and oriented to person, place, and time.   Skin: Skin is warm and dry. No rash noted.   Psychiatric: He has a normal mood and affect. His behavior is normal. Judgment and thought content normal.   Vitals reviewed.              There is no immunization history on file for this patient.    Health Maintenance   Topic Date Due   • TDAP/TD VACCINES (1 - Tdap) 10/14/1970   • ZOSTER VACCINE (1 of 2) 10/14/2009   • LIPID PANEL  07/22/2020   • HEMOGLOBIN A1C  07/22/2020   • INFLUENZA VACCINE  08/01/2020   • COLONOSCOPY  06/08/2022       Riaz was seen today for annual exam.    Diagnoses and all orders for this visit:    Well adult exam  -     CBC & Differential; Future  -     Comprehensive Metabolic Panel; Future  -     Lipid Panel; Future  -     TSH Rfx On Abnormal To Free T4; Future  -     Hemoglobin A1c; Future  -     Vitamin D 25 Hydroxy; Future  -     PSA Screen; Future  -     PSA Screen  -     Vitamin D 25 Hydroxy  -     Hemoglobin A1c  -     TSH Rfx On Abnormal To Free T4  -     Lipid Panel  -     Comprehensive Metabolic Panel  -     CBC & Differential    Vitamin D deficiency  -     Vitamin D 25 Hydroxy; Future  -     Vitamin D 25 Hydroxy  Currently on replacement.  Prediabetes  -     Comprehensive Metabolic Panel; Future  -     Hemoglobin A1c; Future  -     Hemoglobin A1c  -     Comprehensive Metabolic Panel    Screening for diabetes mellitus  -     Comprehensive Metabolic Panel; Future  -     Comprehensive Metabolic Panel    Screening for malignant neoplasm of prostate  -     PSA Screen; Future  -     PSA Screen  PSA level today.  He declined digital rectal exam.  Discussed signs and symptoms of prostate health.  Screening, lipid  -     Lipid Panel; Future  -     Lipid Panel    Screening for deficiency anemia  -     CBC & Differential; Future  -     CBC & Differential    BMI 26.0-26.9,adult  -      Comprehensive Metabolic Panel; Future  -     Lipid Panel; Future  -     TSH Rfx On Abnormal To Free T4; Future  -     Hemoglobin A1c; Future  -     Hemoglobin A1c  -     TSH Rfx On Abnormal To Free T4  -     Lipid Panel  -     Comprehensive Metabolic Panel        Counseled on health maintenance topics and preventative care recommendations: Tdap vaccine, Shingrix vaccine, prostate cancer screening, colon cancer screening, sun protection, supplements     Return in about 1 year (around 7/27/2021) for Physical.    Dr. Sunita Madden

## 2020-07-28 ENCOUNTER — TELEPHONE (OUTPATIENT)
Dept: FAMILY MEDICINE CLINIC | Facility: CLINIC | Age: 60
End: 2020-07-28

## 2020-07-28 LAB
25(OH)D3+25(OH)D2 SERPL-MCNC: 26.5 NG/ML (ref 30–100)
ALBUMIN SERPL-MCNC: 5 G/DL (ref 3.5–5.2)
ALBUMIN/GLOB SERPL: 1.9 G/DL
ALP SERPL-CCNC: 63 U/L (ref 39–117)
ALT SERPL-CCNC: 27 U/L (ref 1–41)
AST SERPL-CCNC: 22 U/L (ref 1–40)
BASOPHILS # BLD AUTO: 0.02 10*3/MM3 (ref 0–0.2)
BASOPHILS NFR BLD AUTO: 0.4 % (ref 0–1.5)
BILIRUB SERPL-MCNC: 0.5 MG/DL (ref 0–1.2)
BUN SERPL-MCNC: 16 MG/DL (ref 8–23)
BUN/CREAT SERPL: 15.2 (ref 7–25)
CALCIUM SERPL-MCNC: 9 MG/DL (ref 8.6–10.5)
CHLORIDE SERPL-SCNC: 100 MMOL/L (ref 98–107)
CHOLEST SERPL-MCNC: 194 MG/DL (ref 0–200)
CO2 SERPL-SCNC: 25.2 MMOL/L (ref 22–29)
CREAT SERPL-MCNC: 1.05 MG/DL (ref 0.76–1.27)
EOSINOPHIL # BLD AUTO: 0.51 10*3/MM3 (ref 0–0.4)
EOSINOPHIL NFR BLD AUTO: 9.5 % (ref 0.3–6.2)
ERYTHROCYTE [DISTWIDTH] IN BLOOD BY AUTOMATED COUNT: 12.4 % (ref 12.3–15.4)
GLOBULIN SER CALC-MCNC: 2.6 GM/DL
GLUCOSE SERPL-MCNC: 159 MG/DL (ref 65–99)
HBA1C MFR BLD: 6.8 % (ref 4.8–5.6)
HCT VFR BLD AUTO: 44.6 % (ref 37.5–51)
HDLC SERPL-MCNC: 35 MG/DL (ref 40–60)
HGB BLD-MCNC: 15.7 G/DL (ref 13–17.7)
IMM GRANULOCYTES # BLD AUTO: 0.03 10*3/MM3 (ref 0–0.05)
IMM GRANULOCYTES NFR BLD AUTO: 0.6 % (ref 0–0.5)
LDLC SERPL CALC-MCNC: 136 MG/DL (ref 0–100)
LYMPHOCYTES # BLD AUTO: 1.37 10*3/MM3 (ref 0.7–3.1)
LYMPHOCYTES NFR BLD AUTO: 25.6 % (ref 19.6–45.3)
MCH RBC QN AUTO: 31.8 PG (ref 26.6–33)
MCHC RBC AUTO-ENTMCNC: 35.2 G/DL (ref 31.5–35.7)
MCV RBC AUTO: 90.3 FL (ref 79–97)
MONOCYTES # BLD AUTO: 0.43 10*3/MM3 (ref 0.1–0.9)
MONOCYTES NFR BLD AUTO: 8 % (ref 5–12)
NEUTROPHILS # BLD AUTO: 3 10*3/MM3 (ref 1.7–7)
NEUTROPHILS NFR BLD AUTO: 55.9 % (ref 42.7–76)
NRBC BLD AUTO-RTO: 0 /100 WBC (ref 0–0.2)
PLATELET # BLD AUTO: 193 10*3/MM3 (ref 140–450)
POTASSIUM SERPL-SCNC: 4.1 MMOL/L (ref 3.5–5.2)
PROT SERPL-MCNC: 7.6 G/DL (ref 6–8.5)
PSA SERPL-MCNC: 1.15 NG/ML (ref 0–4)
RBC # BLD AUTO: 4.94 10*6/MM3 (ref 4.14–5.8)
SODIUM SERPL-SCNC: 135 MMOL/L (ref 136–145)
TRIGL SERPL-MCNC: 117 MG/DL (ref 0–150)
TSH SERPL DL<=0.005 MIU/L-ACNC: 2.24 UIU/ML (ref 0.27–4.2)
VLDLC SERPL CALC-MCNC: 23.4 MG/DL
WBC # BLD AUTO: 5.36 10*3/MM3 (ref 3.4–10.8)

## 2020-07-28 NOTE — TELEPHONE ENCOUNTER
The test results show prostate cancer screening is normal.  Vitamin D is slightly low.  I would consider taking prescription strength vitamin D for 6 months to help boost your storage vitamin D and then resume over-the-counter.  If you are interested in a prescription I can send it to your pharmacy.  A1c is elevated now in the diabetes range.  Please schedule an appointment so we can talk about diabetes and treatment options.  Cholesterol level has increased both total cholesterol and LDL bad cholesterol.  Metabolic panel shows that sugar is elevated as is consistent with diabetes.  Kidney function and liver function are stable.  Sodium is borderline low but other electrolytes are normal.  White blood cell count is normal and no anemia.  Normal thyroid function.

## 2020-08-24 ENCOUNTER — TELEMEDICINE (OUTPATIENT)
Dept: FAMILY MEDICINE CLINIC | Facility: CLINIC | Age: 60
End: 2020-08-24

## 2020-08-24 DIAGNOSIS — E55.9 VITAMIN D DEFICIENCY: ICD-10-CM

## 2020-08-24 DIAGNOSIS — E11.9 NEW ONSET TYPE 2 DIABETES MELLITUS (HCC): Primary | ICD-10-CM

## 2020-08-24 PROCEDURE — 99214 OFFICE O/P EST MOD 30 MIN: CPT | Performed by: FAMILY MEDICINE

## 2020-08-24 NOTE — PROGRESS NOTES
Telehealth video visit.   You have chosen to receive care through a telehealth visit.  Do you consent to use a video/audio connection for your medical care today? Yes      Chief Complaint   Patient presents with   • Diabetes   • Vitamin D Deficiency        Diabetes   He presents for his initial diabetic visit. He has type 2 diabetes mellitus. The initial diagnosis of diabetes was made 1 month ago. There are no diabetic associated symptoms. Pertinent negatives for diabetes include no fatigue, no polydipsia and no polyuria. There are no diabetic complications. When asked about current treatments, none were reported. He is following a generally healthy diet. An ACE inhibitor/angiotensin II receptor blocker is not being taken.      Patient has a prior history of prediabetes. At physical in 2019, A1c was 6.15 and then a year later at next physical A1c was 6.8. Positive family history of diabetes. He's trying to watch white potatoes, starches, pastas and breads. He has considered a bike or rower to do in the home. He's scared needles. Wife has been using local honey instead of sugar. He drinks water and unsweet tea.     He has vitamin D deficiency and is taking 2000 units OTC daily lately.              Review of Systems   Constitutional: Negative for fatigue.   Endocrine: Negative for polydipsia and polyuria.        Current Outpatient Medications   Medication Sig Dispense Refill   • Ascorbic Acid (VITAMIN C ADULT GUMMIES PO) Take 1 tablet by mouth Daily.     • cholecalciferol (VITAMIN D3) 1000 units tablet Take 4 Units by mouth Daily.     • ELDERBERRY PO Take  by mouth.     • Multiple Vitamins-Minerals (MENS MULTIVITAMIN PO) Take  by mouth.       No current facility-administered medications for this visit.        No Known Allergies    Past Medical History:   Diagnosis Date   • Allergic rhinitis    • Arthritis    • BPH (benign prostatic hyperplasia)    • Colon polyp 06/08/2017    tubular adenoma   • Degenerative joint  disease    • Diabetes mellitus (CMS/HCC)    • Diverticulosis    • Elevated liver enzymes 2017    fibrosure high   • Fractures     ankle   • GERD (gastroesophageal reflux disease)    • Hereditary hemochromatosis (CMS/HCC) 05/16/2017    carrier   • Hyperlipidemia    • Hypertension    • Kidney stone    • Proctalgia fugax    • Pruritus ani    • Rotator cuff disorder 01/09/2014   • Testicular hypofunction    • Vitamin D deficiency         Past Surgical History:   Procedure Laterality Date   • ANKLE SURGERY      Right   • DENTAL PROCEDURE  07/08/2020   • KIDNEY STONE SURGERY  2012        Family History   Adopted: Yes   Problem Relation Age of Onset   • Diabetes Mother    • Diabetes Other         Social History     Socioeconomic History   • Marital status:      Spouse name: Not on file   • Number of children: Not on file   • Years of education: Not on file   • Highest education level: Not on file   Occupational History   • Occupation:    Tobacco Use   • Smoking status: Never Smoker   • Smokeless tobacco: Never Used   Substance and Sexual Activity   • Alcohol use: No   • Drug use: No   • Sexual activity: Defer        There were no vitals filed for this visit.   There is no height or weight on file to calculate BMI.    Physical Exam   Constitutional: He is oriented to person, place, and time. He is cooperative. He does not appear ill. No distress.   HENT:   Normal hearing   Eyes: Conjunctivae are normal. Right eye exhibits no discharge. Left eye exhibits no discharge.   Pulmonary/Chest: Effort normal. No respiratory distress.   Neurological: He is alert and oriented to person, place, and time.   Psychiatric: He has a normal mood and affect. His behavior is normal. Judgment and thought content normal.      Results for orders placed or performed in visit on 07/27/20   PSA Screen   Result Value Ref Range    PSA 1.150 0.000 - 4.000 ng/mL   Vitamin D 25 Hydroxy   Result Value Ref Range    25 Hydroxy, Vitamin D  26.5 (L) 30.0 - 100.0 ng/ml   Hemoglobin A1c   Result Value Ref Range    Hemoglobin A1C 6.80 (H) 4.80 - 5.60 %   TSH Rfx On Abnormal To Free T4   Result Value Ref Range    TSH 2.240 0.270 - 4.200 uIU/mL   Lipid Panel   Result Value Ref Range    Total Cholesterol 194 0 - 200 mg/dL    Triglycerides 117 0 - 150 mg/dL    HDL Cholesterol 35 (L) 40 - 60 mg/dL    VLDL Cholesterol 23.4 mg/dL    LDL Cholesterol  136 (H) 0 - 100 mg/dL   Comprehensive Metabolic Panel   Result Value Ref Range    Glucose 159 (H) 65 - 99 mg/dL    BUN 16 8 - 23 mg/dL    Creatinine 1.05 0.76 - 1.27 mg/dL    eGFR Non African Am 72 >60 mL/min/1.73    eGFR African Am 87 >60 mL/min/1.73    BUN/Creatinine Ratio 15.2 7.0 - 25.0    Sodium 135 (L) 136 - 145 mmol/L    Potassium 4.1 3.5 - 5.2 mmol/L    Chloride 100 98 - 107 mmol/L    Total CO2 25.2 22.0 - 29.0 mmol/L    Calcium 9.0 8.6 - 10.5 mg/dL    Total Protein 7.6 6.0 - 8.5 g/dL    Albumin 5.00 3.50 - 5.20 g/dL    Globulin 2.6 gm/dL    A/G Ratio 1.9 g/dL    Total Bilirubin 0.5 0.0 - 1.2 mg/dL    Alkaline Phosphatase 63 39 - 117 U/L    AST (SGOT) 22 1 - 40 U/L    ALT (SGPT) 27 1 - 41 U/L   CBC & Differential   Result Value Ref Range    WBC 5.36 3.40 - 10.80 10*3/mm3    RBC 4.94 4.14 - 5.80 10*6/mm3    Hemoglobin 15.7 13.0 - 17.7 g/dL    Hematocrit 44.6 37.5 - 51.0 %    MCV 90.3 79.0 - 97.0 fL    MCH 31.8 26.6 - 33.0 pg    MCHC 35.2 31.5 - 35.7 g/dL    RDW 12.4 12.3 - 15.4 %    Platelets 193 140 - 450 10*3/mm3    Neutrophil Rel % 55.9 42.7 - 76.0 %    Lymphocyte Rel % 25.6 19.6 - 45.3 %    Monocyte Rel % 8.0 5.0 - 12.0 %    Eosinophil Rel % 9.5 (H) 0.3 - 6.2 %    Basophil Rel % 0.4 0.0 - 1.5 %    Neutrophils Absolute 3.00 1.70 - 7.00 10*3/mm3    Lymphocytes Absolute 1.37 0.70 - 3.10 10*3/mm3    Monocytes Absolute 0.43 0.10 - 0.90 10*3/mm3    Eosinophils Absolute 0.51 (H) 0.00 - 0.40 10*3/mm3    Basophils Absolute 0.02 0.00 - 0.20 10*3/mm3    Immature Granulocyte Rel % 0.6 (H) 0.0 - 0.5 %    Immature Grans  Absolute 0.03 0.00 - 0.05 10*3/mm3    nRBC 0.0 0.0 - 0.2 /100 WBC         Riaz was seen today for diabetes and vitamin d deficiency.    Diagnoses and all orders for this visit:    New onset type 2 diabetes mellitus (CMS/[facility])  -     Ambulatory Referral to Diabetic Education  New. Start therapeutic lifestyle changes. Start home exercise. Diabetes education and recommend ADA. He declined home glucose monitoring. Plan to repeat A1c in the office in 3 months.   Patient's There is no height or weight on file to calculate BMI. BMI is above normal parameters. Recommendations include: exercise counseling, nutrition counseling and referral to a nutritionist.    Vitamin D deficiency  Increase to vitamin D 4000 units daily.       Return in about 3 months (around 11/24/2020) for Office visit diabetes and A1c.    Start of visit 8:04 am . End of visit 8:21 am.    Dr. Sunita Madden

## 2020-09-21 ENCOUNTER — APPOINTMENT (OUTPATIENT)
Dept: DIABETES SERVICES | Facility: HOSPITAL | Age: 60
End: 2020-09-21

## 2020-10-06 ENCOUNTER — APPOINTMENT (OUTPATIENT)
Dept: DIABETES SERVICES | Facility: HOSPITAL | Age: 60
End: 2020-10-06

## 2020-12-01 ENCOUNTER — OFFICE VISIT (OUTPATIENT)
Dept: FAMILY MEDICINE CLINIC | Facility: CLINIC | Age: 60
End: 2020-12-01

## 2020-12-01 VITALS
DIASTOLIC BLOOD PRESSURE: 74 MMHG | BODY MASS INDEX: 27.02 KG/M2 | WEIGHT: 193 LBS | HEIGHT: 71 IN | HEART RATE: 82 BPM | OXYGEN SATURATION: 98 % | SYSTOLIC BLOOD PRESSURE: 126 MMHG

## 2020-12-01 DIAGNOSIS — M53.3 COCCYX PAIN: ICD-10-CM

## 2020-12-01 DIAGNOSIS — E11.9 TYPE 2 DIABETES MELLITUS WITHOUT COMPLICATION, WITHOUT LONG-TERM CURRENT USE OF INSULIN (HCC): Primary | ICD-10-CM

## 2020-12-01 DIAGNOSIS — R29.810 FACIAL WEAKNESS: ICD-10-CM

## 2020-12-01 LAB — HBA1C MFR BLD: 6.2 %

## 2020-12-01 PROCEDURE — 83036 HEMOGLOBIN GLYCOSYLATED A1C: CPT | Performed by: FAMILY MEDICINE

## 2020-12-01 PROCEDURE — 99214 OFFICE O/P EST MOD 30 MIN: CPT | Performed by: FAMILY MEDICINE

## 2020-12-01 NOTE — PROGRESS NOTES
Chief Complaint   Patient presents with   • Diabetes   • Fall     patient had a fall back in august, came in to see [facility], states that he injured his elbow and hit his face, states that he is still having some issues with his face and then pain in his back and left hip area        Diabetes  He presents for his follow-up diabetic visit. He has type 2 diabetes mellitus. Pertinent negatives for diabetes include no chest pain and no fatigue. Current diabetic treatment includes diet.   Fall  The accident occurred more than 1 week ago. The point of impact was the face, left elbow and right knee. The pain is present in the back and left hip. He has tried NSAID for the symptoms.      He has watches sweets in his diet. He takes gum to the office instead of sweets. He watches amount of sugar he takes it.     He was painting on step ladder at home. He smacked his face on ladder. Wonders about damage to ligament on left side of face. For awhile mouth would draw to one side, getting better but still not right. His left elbow has never been right, the elbow point is larger and not able to straighten it.     He's never been flexible. Chiropractor says its like touching concrete. He has had massages. Over the last few months sitting so much tailbone is tender, especially getting  Up from a chair. The past week difficulty getting right sock on. H/o broken leg on right. After getting sock feels like a stabbing knife in left lower back once standing up. Severe pain almost takes his breath away.     Occasional trouble sleeping. One night his heart was beating out of his chest. Took time to calm down. Never had pain. Lasted around 30 minutes.         Review of Systems   Constitutional: Negative for fatigue.   Cardiovascular: Positive for palpitations. Negative for chest pain.   Musculoskeletal: Positive for arthralgias and back pain.   Psychiatric/Behavioral: Positive for sleep disturbance.        Patient Active Problem List   Diagnosis    • Right bundle branch block (RBBB)   • Vitamin D deficiency   • Diabetes mellitus (CMS/HCC)   • Coccyx pain       Current Outpatient Medications   Medication Sig Dispense Refill   • APPLE CIDER VINEGAR PO Take  by mouth.     • Ascorbic Acid (VITAMIN C ADULT GUMMIES PO) Take 1 tablet by mouth Daily.     • aspirin 81 MG chewable tablet Chew 81 mg Daily.     • cholecalciferol (VITAMIN D3) 1000 units tablet Take 4 Units by mouth Daily.     • ELDERBERRY PO Take  by mouth.     • Multiple Vitamins-Minerals (MENS MULTIVITAMIN PO) Take  by mouth.     • Probiotic Product (PROBIOTIC-10 PO) Take  by mouth.       No current facility-administered medications for this visit.        No Known Allergies    Past Medical History:   Diagnosis Date   • Allergic rhinitis    • Arthritis    • BPH (benign prostatic hyperplasia)    • Colon polyp 06/08/2017    tubular adenoma   • Degenerative joint disease    • Diabetes mellitus (CMS/HCC)    • Diverticulosis    • Elevated liver enzymes 2017    fibrosure high   • Fractures     ankle   • GERD (gastroesophageal reflux disease)    • Hereditary hemochromatosis (CMS/HCC) 05/16/2017    carrier   • Hyperlipidemia    • Hypertension    • Kidney stone    • Proctalgia fugax    • Pruritus ani    • Rotator cuff disorder 01/09/2014   • Testicular hypofunction    • Vitamin D deficiency         Past Surgical History:   Procedure Laterality Date   • ANKLE SURGERY      Right   • DENTAL PROCEDURE  07/08/2020   • KIDNEY STONE SURGERY  2012        Family History   Adopted: Yes   Problem Relation Age of Onset   • Diabetes Mother    • Diabetes Other         Social History     Socioeconomic History   • Marital status:      Spouse name: Not on file   • Number of children: Not on file   • Years of education: Not on file   • Highest education level: Not on file   Occupational History   • Occupation:    Tobacco Use   • Smoking status: Never Smoker   • Smokeless tobacco: Never Used   Substance and  "Sexual Activity   • Alcohol use: No   • Drug use: No   • Sexual activity: Defer        Vitals:    12/01/20 0802   BP: 126/74   BP Location: Left arm   Patient Position: Sitting   Cuff Size: Adult   Pulse: 82   SpO2: 98%   Weight: 87.5 kg (193 lb)   Height: 180.3 cm (71\")      Body mass index is 26.92 kg/m².    Physical Exam  Constitutional:       General: He is not in acute distress.     Appearance: He is not ill-appearing.   HENT:      Head:     Cardiovascular:      Rate and Rhythm: Normal rate and regular rhythm.      Heart sounds: No murmur.   Pulmonary:      Effort: Pulmonary effort is normal.      Breath sounds: Normal breath sounds.   Musculoskeletal:      Left elbow: He exhibits decreased range of motion ( extension) and deformity ( olecranon).      Right hip: He exhibits normal range of motion and no tenderness.      Left hip: He exhibits tenderness (greater trochanter). He exhibits normal range of motion.      Lumbar back: He exhibits normal range of motion and no spasm.        Back:    Neurological:      Mental Status: He is alert.      Comments: SLR negative bilateral.  No facial droop   Psychiatric:         Mood and Affect: Mood normal.         Behavior: Behavior normal.         Thought Content: Thought content normal.         Judgment: Judgment normal.         Results for orders placed or performed in visit on 12/01/20   POC Glycosylated Hemoglobin (Hb A1C)    Specimen: Blood   Result Value Ref Range    Hemoglobin A1C 6.2 %      CMP:  Lab Results   Component Value Date    GLUCOSE 108 (H) 07/22/2019    BUN 16 07/27/2020    CREATININE 1.05 07/27/2020    EGFRIFNONA 72 07/27/2020    EGFRIFAFRI 87 07/27/2020    BCR 15.2 07/27/2020     (L) 07/27/2020    K 4.1 07/27/2020    CO2 25.2 07/27/2020    CALCIUM 9.0 07/27/2020    PROTENTOTREF 7.6 07/27/2020    ALBUMIN 5.00 07/27/2020    LABGLOBREF 2.6 07/27/2020    LABIL2 1.9 07/27/2020    BILITOT 0.5 07/27/2020    ALKPHOS 63 07/27/2020    AST 22 07/27/2020    " ALT 27 07/27/2020     Lipid Panel:  Lab Results   Component Value Date    CHOL 170 07/22/2019    TRIG 117 07/27/2020    HDL 35 (L) 07/27/2020    VLDL 23.4 07/27/2020     (H) 07/27/2020     HbA1c:  Lab Results   Component Value Date    HGBA1C 6.2 12/01/2020    HGBA1C 6.80 (H) 07/27/2020     Microalbumin:  No results found for: MICROALBUR        Xr Facial Bones 3+ View    Result Date: 8/30/2020  No displaced facial fractures.  No acute findings within the elbow. Chronic degenerative changes.  This report was finalized on 8/30/2020 11:05 AM by Arvind Ordonez.      Xr Elbow 3+ View Left    Result Date: 8/30/2020  No displaced facial fractures.  No acute findings within the elbow. Chronic degenerative changes.  This report was finalized on 8/30/2020 11:05 AM by Arvind Ordonez.      Diagnoses and all orders for this visit:    1. Type 2 diabetes mellitus without complication, without long-term current use of insulin (CMS/[facility]) (Primary)  -     POC Glycosylated Hemoglobin (Hb A1C)  Improved with dietary changes.  Plan to recheck his A1c with his physical in the summer.  2. Coccyx pain  Chronic, worsening.  Recommend physical therapy evaluation.  At this time patient plans to continue with chiropractor.  Discussed use of topical Voltaren gel as needed.  NSAID pills if pain is worsening.  3. Facial weakness  Patient reports improvement gradually over time.  At this time there is no facial droop but he can feel a difference on the left side.  Of note it was the place of impact during his fall.  Continue to monitor.  No need for repeat imaging at this time.      Return for Physical, as scheduled.    Electronically signed by Sunita Madden MD, 12/01/20, 8:41 AM EST.

## 2021-08-05 ENCOUNTER — LAB (OUTPATIENT)
Dept: LAB | Facility: HOSPITAL | Age: 61
End: 2021-08-05

## 2021-08-05 ENCOUNTER — OFFICE VISIT (OUTPATIENT)
Dept: FAMILY MEDICINE CLINIC | Facility: CLINIC | Age: 61
End: 2021-08-05

## 2021-08-05 VITALS
BODY MASS INDEX: 26.46 KG/M2 | HEIGHT: 71 IN | HEART RATE: 74 BPM | SYSTOLIC BLOOD PRESSURE: 122 MMHG | DIASTOLIC BLOOD PRESSURE: 76 MMHG | OXYGEN SATURATION: 98 % | TEMPERATURE: 96.8 F | WEIGHT: 189 LBS

## 2021-08-05 DIAGNOSIS — Z00.00 WELL ADULT EXAM: Primary | ICD-10-CM

## 2021-08-05 DIAGNOSIS — Z12.5 SCREENING FOR MALIGNANT NEOPLASM OF PROSTATE: ICD-10-CM

## 2021-08-05 DIAGNOSIS — E55.9 VITAMIN D DEFICIENCY: ICD-10-CM

## 2021-08-05 DIAGNOSIS — E66.3 OVERWEIGHT (BMI 25.0-29.9): ICD-10-CM

## 2021-08-05 DIAGNOSIS — E11.9 TYPE 2 DIABETES MELLITUS WITHOUT COMPLICATION, WITHOUT LONG-TERM CURRENT USE OF INSULIN (HCC): ICD-10-CM

## 2021-08-05 DIAGNOSIS — Z00.00 WELL ADULT EXAM: ICD-10-CM

## 2021-08-05 LAB
25(OH)D3+25(OH)D2 SERPL-MCNC: 66.4 NG/ML (ref 30–100)
ALBUMIN SERPL-MCNC: 4.8 G/DL (ref 3.5–5.2)
ALBUMIN/GLOB SERPL: 1.7 G/DL
ALP SERPL-CCNC: 76 U/L (ref 39–117)
ALT SERPL-CCNC: 28 U/L (ref 1–41)
AST SERPL-CCNC: 22 U/L (ref 1–40)
BILIRUB SERPL-MCNC: 0.4 MG/DL (ref 0–1.2)
BUN SERPL-MCNC: 11 MG/DL (ref 8–23)
BUN/CREAT SERPL: 10.8 (ref 7–25)
CALCIUM SERPL-MCNC: 9.3 MG/DL (ref 8.6–10.5)
CHLORIDE SERPL-SCNC: 102 MMOL/L (ref 98–107)
CHOLEST SERPL-MCNC: 176 MG/DL (ref 0–200)
CO2 SERPL-SCNC: 27.9 MMOL/L (ref 22–29)
CREAT SERPL-MCNC: 1.02 MG/DL (ref 0.76–1.27)
ERYTHROCYTE [DISTWIDTH] IN BLOOD BY AUTOMATED COUNT: 12.5 % (ref 12.3–15.4)
GLOBULIN SER CALC-MCNC: 2.8 GM/DL
GLUCOSE SERPL-MCNC: 145 MG/DL (ref 65–99)
HBA1C MFR BLD: 6.1 %
HCT VFR BLD AUTO: 44.6 % (ref 37.5–51)
HDLC SERPL-MCNC: 36 MG/DL (ref 40–60)
HGB BLD-MCNC: 14.9 G/DL (ref 13–17.7)
LDLC SERPL CALC-MCNC: 123 MG/DL (ref 0–100)
MCH RBC QN AUTO: 30.8 PG (ref 26.6–33)
MCHC RBC AUTO-ENTMCNC: 33.4 G/DL (ref 31.5–35.7)
MCV RBC AUTO: 92.1 FL (ref 79–97)
PLATELET # BLD AUTO: 199 10*3/MM3 (ref 140–450)
POTASSIUM SERPL-SCNC: 4.2 MMOL/L (ref 3.5–5.2)
PROT SERPL-MCNC: 7.6 G/DL (ref 6–8.5)
PSA SERPL-MCNC: 1.11 NG/ML (ref 0–4)
RBC # BLD AUTO: 4.84 10*6/MM3 (ref 4.14–5.8)
SODIUM SERPL-SCNC: 139 MMOL/L (ref 136–145)
TRIGL SERPL-MCNC: 94 MG/DL (ref 0–150)
TSH SERPL DL<=0.005 MIU/L-ACNC: 2.12 UIU/ML (ref 0.27–4.2)
VLDLC SERPL CALC-MCNC: 17 MG/DL (ref 5–40)
WBC # BLD AUTO: 5.87 10*3/MM3 (ref 3.4–10.8)

## 2021-08-05 PROCEDURE — 83036 HEMOGLOBIN GLYCOSYLATED A1C: CPT | Performed by: FAMILY MEDICINE

## 2021-08-05 PROCEDURE — 3044F HG A1C LEVEL LT 7.0%: CPT | Performed by: FAMILY MEDICINE

## 2021-08-05 PROCEDURE — 99396 PREV VISIT EST AGE 40-64: CPT | Performed by: FAMILY MEDICINE

## 2021-08-05 NOTE — PROGRESS NOTES
"Chief Complaint  Annual Exam (Pt states he is fasting today. ) and Diabetes (Pt states he does not check his suagr daily. )    Subjective          Riaz Luevano presents to [facility] for   History of Present Illness     He recently moved to property with 5 acres and has had more physical activity.  He enjoys being outdoors with riding lawn more rather than [place] the [place] with a push lawnmower.  He has worn a hat once.  He does not use sun protection.  With the increased activity he has lost 5 pounds already.    For diabetes he has tried to watch white foods and not much sweets in his diet.    Arthritis in hands he has tried osteo biflex, swelling of knuckles and affects  when in pain.     At times after eating has pressure in epigastric that radiates to his stomach. Lasts a few minutes. No chest pain with exertion.       Objective   Vital Signs:   Vitals:    08/05/21 0835   BP: 122/76   Pulse: 74   Temp: 96.8 °F (36 °C)   SpO2: 98%   Weight: 85.7 kg (189 lb)   Height: 180.3 cm (71\")   PainSc: 0-No pain     Body mass index is 26.36 kg/m².      Physical Exam  Constitutional:       General: He is not in acute distress.     Appearance: He is overweight.   HENT:      Right Ear: Tympanic membrane and ear canal normal.      Left Ear: Tympanic membrane and ear canal normal.   Eyes:      General:         Right eye: No discharge.         Left eye: No discharge.      Conjunctiva/sclera: Conjunctivae normal.   Neck:      Thyroid: No thyromegaly.   Cardiovascular:      Rate and Rhythm: Normal rate and regular rhythm.   Pulmonary:      Effort: Pulmonary effort is normal.      Breath sounds: Normal breath sounds.   Abdominal:      Palpations: Abdomen is soft.      Tenderness: There is no abdominal tenderness.   Musculoskeletal:      Cervical back: Neck supple.   Lymphadenopathy:      Head:      Right side of head: No submandibular, preauricular or posterior auricular adenopathy.      " Left side of head: No submandibular, preauricular or posterior auricular adenopathy.      Cervical: No cervical adenopathy.   Skin:     General: Skin is warm and dry.   Neurological:      Mental Status: He is alert and oriented to person, place, and time.   Psychiatric:         Mood and Affect: Mood normal.         Behavior: Behavior normal.         Thought Content: Thought content normal.         Judgment: Judgment normal.        Result Review :   The following data was reviewed by: Sunita Madden MD on 08/05/2021:  Common labs    Common Labsle 12/1/20   Hemoglobin A1C 6.2                    Immunization History   Administered Date(s) Administered   • COVID-19 (PFIZER) 03/04/2021, 03/26/2021       Health Maintenance   Topic Date Due   • TDAP/TD VACCINES (1 - Tdap) Never done   • ZOSTER VACCINE (1 of 2) Never done   • DIABETIC FOOT EXAM  Never done   • DIABETIC EYE EXAM  Never done   • URINE MICROALBUMIN  Never done   • HEMOGLOBIN A1C  06/01/2021   • LIPID PANEL  07/27/2021   • INFLUENZA VACCINE  10/01/2021            Assessment and Plan    Diagnoses and all orders for this visit:    1. Well adult exam (Primary)  -     CBC (No Diff); Future  -     Comprehensive Metabolic Panel; Future  -     TSH Rfx On Abnormal To Free T4; Future  -     Lipid Panel; Future  -     PSA Screen; Future  Check fasting labs today.  Colonoscopy is up-to-date.  Prostate cancer screening with PSA level today.  Patient declined immunizations.  Discussed he does not need to continue aspirin for cardiovascular prevention but if he chooses to do so monitor for increased bleeding bruising.  We will check his cholesterol today with an LDL goal less than 100.  If he experiences episodes of sustained chest pain advised to go to the emergency room.  2. Type 2 diabetes mellitus without complication, without long-term current use of insulin (CMS/[facility])  -     POC Glycosylated Hemoglobin (Hb A1C)  Diet controlled.  A1c continues to improve.   Recheck at 6 months.  3. Vitamin D deficiency  -     Vitamin D 25 Hydroxy; Future  Oertli on replacement.  4. Screening for malignant neoplasm of prostate  -     PSA Screen; Future    5. Overweight (BMI 25.0-29.9)  Patient's (Body mass index is 26.36 kg/m².) indicates that they are overweight (BMI 25-29.9) with health related conditions that include diabetes mellitus and GERD . Weight is improving with lifestyle modifications. BMI is is above average; BMI management plan is completed. We discussed low calorie, low carb based diet program and increasing exercise.         Counseled on health maintenance topics and preventative care recommendations: Shingrix vaccine, Tdap vaccine, prostate cancer screening, colon cancer screening, diet, sun protection, cardiovascular risk      Follow Up   Return in about 6 months (around 2/5/2022) for Office visit diabetes and physical in 1 year.  Patient was given instructions and counseling regarding his condition or for health maintenance advice. Please see specific information pulled into the AVS if appropriate.      Electronically signed by Sunita Madden MD, 08/05/21, 9:40 AM EDT.

## 2021-08-05 NOTE — PATIENT INSTRUCTIONS
"https://www.cdc.gov/vaccines/hcp/vis/vis-statements/tdap.pdf\">   Tdap (Tetanus, Diphtheria, Pertussis) Vaccine: What You Need to Know  1. Why get vaccinated?  Tdap vaccine can prevent tetanus, diphtheria, and pertussis.  Diphtheria and pertussis spread from person to person. Tetanus enters the body through cuts or wounds.  · TETANUS (T) causes painful stiffening of the muscles. Tetanus can lead to serious health problems, including being unable to open the mouth, having trouble swallowing and breathing, or death.  · DIPHTHERIA (D) can lead to difficulty breathing, heart failure, paralysis, or death.  · PERTUSSIS (aP), also known as \"whooping cough,\" can cause uncontrollable, violent coughing which makes it hard to breathe, eat, or drink. Pertussis can be extremely serious in babies and young children, causing pneumonia, convulsions, brain damage, or death. In teens and adults, it can cause weight loss, loss of bladder control, passing out, and rib fractures from severe coughing.  2. Tdap vaccine  Tdap is only for children 7 years and older, adolescents, and adults.   Adolescents should receive a single dose of Tdap, preferably at age 11 or 12 years.  Pregnant women should get a dose of Tdap during every pregnancy, to protect the  from pertussis. Infants are most at risk for severe, life-threatening complications from pertussis.  Adults who have never received Tdap should get a dose of Tdap.  Also, adults should receive a booster dose every 10 years, or earlier in the case of a severe and dirty wound or burn. Booster doses can be either Tdap or Td (a different vaccine that protects against tetanus and diphtheria but not pertussis).  Tdap may be given at the same time as other vaccines.  3. Talk with your health care provider  Tell your vaccine provider if the person getting the vaccine:  · Has had an allergic reaction after a previous dose of any vaccine that protects against tetanus, diphtheria, or pertussis, " or has any severe, life-threatening allergies.  · Has had a coma, decreased level of consciousness, or prolonged seizures within 7 days after a previous dose of any pertussis vaccine (DTP, DTaP, or Tdap).  · Has seizures or another nervous system problem.  · Has ever had Guillain-Barré Syndrome (also called GBS).  · Has had severe pain or swelling after a previous dose of any vaccine that protects against tetanus or diphtheria.  In some cases, your health care provider may decide to postpone Tdap vaccination to a future visit.   People with minor illnesses, such as a cold, may be vaccinated. People who are moderately or severely ill should usually wait until they recover before getting Tdap vaccine.   Your health care provider can give you more information.  4. Risks of a vaccine reaction  · Pain, redness, or swelling where the shot was given, mild fever, headache, feeling tired, and nausea, vomiting, diarrhea, or stomachache sometimes happen after Tdap vaccine.  People sometimes faint after medical procedures, including vaccination. Tell your provider if you feel dizzy or have vision changes or ringing in the ears.   As with any medicine, there is a very remote chance of a vaccine causing a severe allergic reaction, other serious injury, or death.  5. What if there is a serious problem?  An allergic reaction could occur after the vaccinated person leaves the clinic. If you see signs of a severe allergic reaction (hives, swelling of the face and throat, difficulty breathing, a fast heartbeat, dizziness, or weakness), call 9-1-1 and get the person to the nearest hospital.  For other signs that concern you, call your health care provider.   Adverse reactions should be reported to the Vaccine Adverse Event Reporting System (VAERS). Your health care provider will usually file this report, or you can do it yourself. Visit the VAERS website at www.vaers.hhs.gov or call 1-853.635.7640. VAERS is only for reporting  reactions, and [facility] staff do not give medical advice.  6. The National Vaccine Injury Compensation Program  The National Vaccine Injury Compensation Program (VICP) is a federal program that was created to compensate people who may have been injured by certain vaccines. Visit the VICP website at www.[facility].gov/vaccinecompensation or call 1-340.939.9041 to learn about the program and about filing a claim. There is a time limit to file a claim for compensation.  7. How can I learn more?  · Ask your health care provider.  · Call your local or state health department.  · Contact the Centers for Disease Control and Prevention (CDC):  ? Call 1-602.749.4876 (9-920-ANG-INFO) or  ? Visit CDC's website at www.cdc.gov/vaccines  Vaccine Information Statement Tdap (Tetanus, Diphtheria, Pertussis) Vaccine (04/01/2020)  This information is not intended to replace advice given to you by your health care provider. Make sure you discuss any questions you have with your health care provider.  Document Revised: 04/10/2020 Document Reviewed: 04/13/2020  Else[employer] Patient Education © 2021 [employer]    Recombinant Zoster (Shingles) Vaccine: What You Need to Know  1. Why get vaccinated?  Recombinant zoster (shingles) vaccine can prevent shingles.  Shingles (also called herpes zoster, or just zoster) is a painful skin rash, usually with blisters. In addition to the rash, shingles can cause fever, headache, chills, or upset stomach. More rarely, shingles can lead to pneumonia, hearing problems, blindness, brain inflammation (encephalitis), or death.  The most common complication of shingles is long-term nerve pain called postherpetic neuralgia (PHN). PHN occurs in the areas where the shingles rash was, even after the rash clears up. It can last for months or years after the rash goes away. The pain from PHN can be severe and debilitating.  About 10 to 18% of people who get shingles will experience PHN. The risk of PHN increases with age. An  older adult with shingles is more likely to develop PHN and have longer lasting and more severe pain than a younger person with shingles.  Shingles is caused by the varicella zoster virus, the same virus that causes chickenpox. After you have chickenpox, the virus stays in your body and can cause shingles later in life. Shingles cannot be passed from one person to another, but the virus that causes shingles can spread and cause chickenpox in someone who had never had chickenpox or received chickenpox vaccine.  2. Recombinant shingles vaccine  Recombinant shingles vaccine provides strong protection against shingles. By preventing shingles, recombinant shingles vaccine also protects against PHN.  Recombinant shingles vaccine is the preferred vaccine for the prevention of shingles. However, a different vaccine, live shingles vaccine, may be used in some circumstances.  The recombinant shingles vaccine is recommended for adults 50 years and older without serious immune problems. It is given as a two-dose series.  This vaccine is also recommended for people who have already gotten another type of shingles vaccine, the live shingles vaccine. There is no live virus in this vaccine.  Shingles vaccine may be given at the same time as other vaccines.  3. Talk with your health care provider  Tell your vaccine provider if the person getting the vaccine:  · Has had an allergic reaction after a previous dose of recombinant shingles vaccine, or has any severe, life-threatening allergies.  · Is pregnant or breastfeeding.  · Is currently experiencing an episode of shingles.  In some cases, your health care provider may decide to postpone shingles vaccination to a future visit.  People with minor illnesses, such as a cold, may be vaccinated. People who are moderately or severely ill should usually wait until they recover before getting recombinant shingles vaccine.  Your health care provider can give you more information.  4. Risks  of a vaccine reaction  · A sore arm with mild or moderate pain is very common after recombinant shingles vaccine, affecting about 80% of vaccinated people. Redness and swelling can also happen at the site of the injection.  · Tiredness, muscle pain, headache, shivering, fever, stomach pain, and nausea happen after vaccination in more than half of people who receive recombinant shingles vaccine.  In clinical trials, about 1 out of 6 people who got recombinant zoster vaccine experienced side effects that prevented them from doing regular activities. Symptoms usually went away on their own in 2 to 3 days.  You should still get the second dose of recombinant zoster vaccine even if you had one of these reactions after the first dose.  People sometimes faint after medical procedures, including vaccination. Tell your provider if you feel dizzy or have vision changes or ringing in the ears.  As with any medicine, there is a very remote chance of a vaccine causing a severe allergic reaction, other serious injury, or death.  5. What if there is a serious problem?  An allergic reaction could occur after the vaccinated person leaves the clinic. If you see signs of a severe allergic reaction (hives, swelling of the face and throat, difficulty breathing, a fast heartbeat, dizziness, or weakness), call 9-1-1 and get the person to the nearest hospital.  For other signs that concern you, call your health care provider.  Adverse reactions should be reported to the Vaccine Adverse Event Reporting System (VAERS). Your health care provider will usually file this report, or you can do it yourself. Visit the VAERS website at www.vaers.hhs.gov or call 1-608.174.8699. VAERS is only for reporting reactions, and VAERS staff do not give medical advice.  6. How can I learn more?  · Ask your health care provider.  · Call your local or state health department.  · Contact the Centers for Disease Control and Prevention (CDC):  ? Call 1-507.582.6166  (4-603-UQJ-INFO) or  ? Visit CDC's website at www.cdc.gov/vaccines  Vaccine Information Statement Recombinant Zoster Vaccine (10/30/2019)  This information is not intended to replace advice given to you by your health care provider. Make sure you discuss any questions you have with your health care provider.  Document Revised: 12/09/2020 Document Reviewed: 12/09/2020  Elsevier Patient Education © 2021 [employer].

## 2021-08-09 ENCOUNTER — TELEPHONE (OUTPATIENT)
Dept: FAMILY MEDICINE CLINIC | Facility: CLINIC | Age: 61
End: 2021-08-09

## 2021-08-09 DIAGNOSIS — E78.5 DYSLIPIDEMIA, GOAL LDL BELOW 100: Primary | ICD-10-CM

## 2021-08-09 RX ORDER — ATORVASTATIN CALCIUM 20 MG/1
20 TABLET, FILM COATED ORAL NIGHTLY
Qty: 90 TABLET | Refills: 3 | Status: CANCELLED | OUTPATIENT
Start: 2021-08-09

## 2021-08-09 NOTE — TELEPHONE ENCOUNTER
I sent a letter in my chart.  Please call patient on the phone to ask about the new prescription of atorvastatin.    The test results show normal prostate cancer screening.  Vitamin D is in the normal range.  Total cholesterol is normal however LDL cholesterol is elevated.  Goal LDL less than 100 since you have diabetes.  I would strongly consider using statin cholesterol-lowering medication to reduce your risk of heart attack and stroke.  If you are interested I will send a prescription for atorvastatin 20 mg to your pharmacy.  Thyroid function is normal.  Fasting sugar is elevated from diabetes.  Normal kidney function, electrolytes, and liver function.  Normal white blood cell count and no anemia.

## 2021-08-09 NOTE — TELEPHONE ENCOUNTER
Attempted to call pt, received no answer. Left  with office number given.    OKAY FOR HUB TO RELAY TEST RESULTS AND DOCUMENT    The test results show normal prostate cancer screening.  Vitamin D is in the normal range.  Total cholesterol is normal however LDL cholesterol is elevated.  Goal LDL less than 100 since you have diabetes.  I would strongly consider using statin cholesterol-lowering medication to reduce your risk of heart attack and stroke.  If you are interested I will send a prescription for atorvastatin 20 mg to your pharmacy.  Thyroid function is normal.  Fasting sugar is elevated from diabetes.  Normal kidney function, electrolytes, and liver function.  Normal white blood cell count and no anemia.

## 2021-08-09 NOTE — TELEPHONE ENCOUNTER
Caller: Riaz Luevano    Relationship: Self    Best call back number: [phone]    What was the call regarding:   PATIENT WAS INFORMED OF MESSAGE AND STATED THAT AT THIS TIME HE WOULD LIKE TO HOLD OFF ON ANY MEDICATION AND WOULD LIKE TO HAVE HIS LAB RECHECKED IN 6 MONTHS.     RAMIRO ANDRADE MA OKAY FOR HUB TO RELAY TEST RESULTS AND DOCUMENT     The test results show normal prostate cancer screening.  Vitamin D is in the normal range.  Total cholesterol is normal however LDL cholesterol is elevated.  Goal LDL less than 100 since you have diabetes.  I would strongly consider using statin cholesterol-lowering medication to reduce your risk of heart attack and stroke.  If you are interested I will send a prescription for atorvastatin 20 mg to your pharmacy.  Thyroid function is normal.  Fasting sugar is elevated from diabetes.  Normal kidney function, electrolytes, and liver function.  Normal white blood cell count and no anemia.      Do you require a callback: NO

## 2022-01-05 ENCOUNTER — OFFICE VISIT (OUTPATIENT)
Dept: FAMILY MEDICINE CLINIC | Facility: CLINIC | Age: 62
End: 2022-01-05

## 2022-01-05 VITALS
DIASTOLIC BLOOD PRESSURE: 72 MMHG | TEMPERATURE: 97.4 F | SYSTOLIC BLOOD PRESSURE: 130 MMHG | OXYGEN SATURATION: 98 % | HEIGHT: 71 IN | HEART RATE: 89 BPM | WEIGHT: 188 LBS | BODY MASS INDEX: 26.32 KG/M2

## 2022-01-05 DIAGNOSIS — R05.9 COUGH: ICD-10-CM

## 2022-01-05 DIAGNOSIS — J02.9 SORE THROAT: ICD-10-CM

## 2022-01-05 DIAGNOSIS — J06.9 ACUTE URI: Primary | ICD-10-CM

## 2022-01-05 DIAGNOSIS — Z20.822 EXPOSURE TO COVID-19 VIRUS: ICD-10-CM

## 2022-01-05 LAB
EXPIRATION DATE: NORMAL
EXPIRATION DATE: NORMAL
FLUAV AG NPH QL: NEGATIVE
FLUBV AG NPH QL: NEGATIVE
INTERNAL CONTROL: NORMAL
INTERNAL CONTROL: NORMAL
Lab: NORMAL
Lab: NORMAL
S PYO AG THROAT QL: NEGATIVE

## 2022-01-05 PROCEDURE — 99213 OFFICE O/P EST LOW 20 MIN: CPT | Performed by: FAMILY MEDICINE

## 2022-01-05 PROCEDURE — 87880 STREP A ASSAY W/OPTIC: CPT | Performed by: FAMILY MEDICINE

## 2022-01-05 PROCEDURE — 87804 INFLUENZA ASSAY W/OPTIC: CPT | Performed by: FAMILY MEDICINE

## 2022-01-05 NOTE — PROGRESS NOTES
"Chief Complaint  Cough, Headache, Chills, Sore Throat, and Nausea    Subjective          Riaz Luevano presents to [facility]  Sore Throat   This is a new problem. The current episode started in the past 7 days. The problem has been gradually worsening. Neither side of throat is experiencing more pain than the other. There has been no fever. The pain is at a severity of 4/10. Associated symptoms include abdominal pain, congestion, coughing ( little mucus), headaches and a hoarse voice. Pertinent negatives include no shortness of breath. Associated symptoms comments: - wheezing. +chills, +nausea. He has had exposure to strep. He has had no exposure to mono.      Answers for HPI/ROS submitted by the patient on 1/5/2022  What is the primary reason for your visit?: Sore Throat    Onset 1/3/22 throat raw and drainage. Daughter's family were in town and were on medication for strep but also COVID positive. Outside COVID test, last one done yesterday but results not available. Two rapid test and 1 PCR not detected COVID. Nasal and chest congestion.           Objective   Vital Signs:   /72   Pulse 89   Temp 97.4 °F (36.3 °C)   Ht 180.3 cm (70.98\")   Wt 85.3 kg (188 lb)   SpO2 98%   BMI 26.23 kg/m²     Physical Exam  Vitals reviewed.   Constitutional:       General: He is not in acute distress.     Appearance: He is ill-appearing. He is not toxic-appearing.   HENT:      Right Ear: Tympanic membrane and ear canal normal.      Left Ear: Tympanic membrane and ear canal normal.      Mouth/Throat:      Mouth: No oral lesions.      Pharynx: Oropharyngeal exudate ( clear PND) present. No pharyngeal swelling, posterior oropharyngeal erythema or uvula swelling.      Tonsils: No tonsillar exudate.   Cardiovascular:      Rate and Rhythm: Normal rate and regular rhythm.   Pulmonary:      Effort: Pulmonary effort is normal.      Breath sounds: Normal breath sounds.   Lymphadenopathy:      Head: "      Right side of head: No submandibular, tonsillar, preauricular or posterior auricular adenopathy.      Left side of head: No submandibular, tonsillar, preauricular or posterior auricular adenopathy.   Neurological:      Mental Status: He is alert.        Result Review :   The following data was reviewed by: Sunita Madden MD on 01/05/2022:  Influenza A&B    [facility] 1/5/22   Rapid Influenza A Ag Negative   Rapid Influenza B Ag Negative           Strep    [facility] 1/5/22   POC Strep A, Molecular Negative                     Assessment and Plan    Diagnoses and all orders for this visit:    1. Acute URI (Primary)    2. Cough  -     POC Influenza A / B    3. Exposure to COVID-19 virus    4. Sore throat  -     POC Rapid Strep A      Outside Covid tests are negative although he has another one pending with no results available at time of appointment.  Rapid flu and strep are negative in the office.  Likely viral infection.  No need for antibiotics.  Recommend symptomatic care.      Follow Up   Return if symptoms worsen or fail to improve.  Patient was given instructions and counseling regarding his condition or for health maintenance advice. Please see specific information pulled into the AVS if appropriate.     Electronically signed by Sunita Madden MD, 01/05/22, 9:00 AM EST.

## 2022-01-06 ENCOUNTER — TELEPHONE (OUTPATIENT)
Dept: FAMILY MEDICINE CLINIC | Facility: CLINIC | Age: 62
End: 2022-01-06

## 2022-01-06 RX ORDER — BROMPHENIRAMINE MALEATE, PSEUDOEPHEDRINE HYDROCHLORIDE, AND DEXTROMETHORPHAN HYDROBROMIDE 2; 30; 10 MG/5ML; MG/5ML; MG/5ML
10 SYRUP ORAL 4 TIMES DAILY PRN
Qty: 120 ML | Refills: 0 | Status: SHIPPED | OUTPATIENT
Start: 2022-01-06 | End: 2022-01-09

## 2022-01-06 NOTE — TELEPHONE ENCOUNTER
CALLED AND SPOKE WITH PATIENT HE SAYS HIS TEST CAME BACK POSITIVE WITH COVID YESTERDAY IT WAS DONE [facility]

## 2022-01-06 NOTE — TELEPHONE ENCOUNTER
Caller: Riaz Luevano    Relationship to patient: Self    Best call back number: [phone]    Date of positive COVID19 test: 1/5/22    COVID19 symptoms: COUGH, CONGESTION, SORE THROAT, HEADACHE, NAUSEA, FATIGUE     Additional information or concerns: PATIENT WOULD LIKE SOMETHING FOR SYMPTOMS     What is the patients preferred pharmacy: [facility]

## 2022-01-06 NOTE — TELEPHONE ENCOUNTER
Contacted patient & relayed PCP's message. Pt verbalized understanding and did not have any additional questions at this time.

## 2022-02-03 ENCOUNTER — OFFICE VISIT (OUTPATIENT)
Dept: FAMILY MEDICINE CLINIC | Facility: CLINIC | Age: 62
End: 2022-02-03

## 2022-02-03 VITALS
HEIGHT: 71 IN | DIASTOLIC BLOOD PRESSURE: 78 MMHG | HEART RATE: 85 BPM | WEIGHT: 191 LBS | BODY MASS INDEX: 26.74 KG/M2 | SYSTOLIC BLOOD PRESSURE: 120 MMHG | OXYGEN SATURATION: 97 % | TEMPERATURE: 97.1 F

## 2022-02-03 DIAGNOSIS — I45.10 RIGHT BUNDLE BRANCH BLOCK (RBBB): ICD-10-CM

## 2022-02-03 DIAGNOSIS — R07.89 OTHER CHEST PAIN: Primary | ICD-10-CM

## 2022-02-03 DIAGNOSIS — E78.5 DYSLIPIDEMIA, GOAL LDL BELOW 100: Chronic | ICD-10-CM

## 2022-02-03 PROCEDURE — 93000 ELECTROCARDIOGRAM COMPLETE: CPT | Performed by: FAMILY MEDICINE

## 2022-02-03 PROCEDURE — 99214 OFFICE O/P EST MOD 30 MIN: CPT | Performed by: FAMILY MEDICINE

## 2022-02-03 RX ORDER — NITROGLYCERIN 0.4 MG/1
0.4 TABLET SUBLINGUAL
Qty: 30 TABLET | Refills: 1 | Status: SHIPPED | OUTPATIENT
Start: 2022-02-03

## 2022-02-03 NOTE — PROGRESS NOTES
"Chief Complaint  Chest Pain (Pt states he's been having chest pressure for a few weeks. )    Subjective          Riaz Luevano presents to [facility]  Chest Pain   This is a new problem.   Answers for HPI/ROS submitted by the patient on 2/3/2022  What is the primary reason for your visit?: Other  Please describe your symptoms.: Chest discomfort when exercising or nervously excited.  Have you had these symptoms before?: Yes  How long have you been having these symptoms?: Greater than 2 weeks    Walking outside in neighborhood slow grade up and down. On a gradient uphill he strains and harder breathing with uncomfortable in chest, not sharp. HR to 120s. Bilateral forearm ache w/o numbness or tingling. Watching [employer] games his heart rate goes to 140s and he feels uncomfortable. Not monitoring home blood pressure. H/o 2 prior stress tests years ago.     Mother (adopted) took NTG when he was a child.     Objective   Vital Signs:   /78   Pulse 85   Temp 97.1 °F (36.2 °C)   Ht 180.3 cm (70.98\")   Wt 86.6 kg (191 lb)   SpO2 97%   BMI 26.65 kg/m²     Physical Exam  Vitals reviewed.   Constitutional:       General: He is not in acute distress.     Appearance: He is not ill-appearing.   Cardiovascular:      Rate and Rhythm: Normal rate and regular rhythm.   Pulmonary:      Effort: Pulmonary effort is normal.      Breath sounds: Normal breath sounds.   Neurological:      Mental Status: He is alert.   Psychiatric:         Mood and Affect: Mood normal.        Result Review :   The following data was reviewed by: Sunita Madden MD on 02/03/2022:  Lipid Panel    Lipid Panel 8/5/21   Total Cholesterol 176   Triglycerides 94   HDL Cholesterol 36 (A)   VLDL Cholesterol 17   LDL Cholesterol  123 (A)   (A) Abnormal value       Comments are available for some flowsheets but are not being displayed.                ECG 12 Lead    Date/Time: 2/3/2022 10:18 AM  Performed by: Malathi" Sunita Madden MD  Authorized by: Sunita Wick MD   Comparison: compared with previous ECG from 7/22/2019  Similar to previous ECG  Rhythm: sinus rhythm  Rate: normal  Conduction: right bundle branch block  ST Segments: ST segments normal  T Waves: T waves normal  QRS axis: normal    Clinical impression: normal ECG              Assessment and Plan    Diagnoses and all orders for this visit:    1. Other chest pain (Primary)  -     Ambulatory Referral to [facility] [place] [facility]  -     nitroglycerin (NITROSTAT) 0.4 MG SL tablet; Place 1 tablet under the tongue Every 5 (Five) Minutes As Needed for Chest Pain. Take no more than 3 doses in 15 minutes.  Dispense: 30 tablet; Refill: 1  -     ECG 12 Lead    2. Dyslipidemia, goal LDL below 100    3. Right bundle branch block (RBBB)      EKG today similar to prior in 2019.  He does have symptoms concerning for angina.  His last stress test was many years ago.  Refer to heart clinic for diagnostic work-up.  Also provided prescription for nitroglycerin to take if he experiences chest discomfort.  Advised when to seek care calling 911 or the emergency room.  He previously took aspirin but ran out, at this time I do not think he needs an aspirin unless cardiovascular disease detected.  His last lipid panel shows LDL was greater than 100 but not currently on medication.  Reassess his lipid status following his cardiac testing.      Follow Up   Return if symptoms worsen or fail to improve.  Patient was given instructions and counseling regarding his condition or for health maintenance advice. Please see specific information pulled into the AVS if appropriate.     Electronically signed by Sunita Madden MD, 02/03/22, 10:44 AM EST.

## 2022-02-09 ENCOUNTER — OFFICE VISIT (OUTPATIENT)
Dept: CARDIOLOGY | Facility: HOSPITAL | Age: 62
End: 2022-02-09

## 2022-02-09 VITALS
HEIGHT: 71 IN | TEMPERATURE: 96.5 F | DIASTOLIC BLOOD PRESSURE: 85 MMHG | SYSTOLIC BLOOD PRESSURE: 153 MMHG | WEIGHT: 193.31 LBS | HEART RATE: 80 BPM | OXYGEN SATURATION: 96 % | BODY MASS INDEX: 27.06 KG/M2 | RESPIRATION RATE: 18 BRPM

## 2022-02-09 DIAGNOSIS — E78.5 DYSLIPIDEMIA, GOAL LDL BELOW 100: ICD-10-CM

## 2022-02-09 DIAGNOSIS — R06.09 DOE (DYSPNEA ON EXERTION): ICD-10-CM

## 2022-02-09 DIAGNOSIS — R07.2 PRECORDIAL PAIN: Primary | ICD-10-CM

## 2022-02-09 DIAGNOSIS — I45.10 RIGHT BUNDLE BRANCH BLOCK (RBBB): ICD-10-CM

## 2022-02-09 PROCEDURE — 99204 OFFICE O/P NEW MOD 45 MIN: CPT | Performed by: NURSE PRACTITIONER

## 2022-02-09 NOTE — PROGRESS NOTES
"Chief Complaint  Chest Pain and Establish Care    Subjective    History of Present Illness {CC  Problem List  Visit  Diagnosis   Encounters  Notes  Medications  Labs  Result Review Imaging  Media :23}       History of Present Illness   61 year old male presents to the office today for ongoing evaluation of his chest pain. Patient notes that he has been experiencing intermittent chest pain that he describes as a dull ache. He notes that the pain is located in the left chest and occurs when walking up a hill in his neighborhood. He notes that he had associated N/T in bilateral lower extremities. He notes mild CASE. Currently denies palpitations, presyncope, syncope, orthopnea,pnd or pedal edema. Hx of stress test greater than 5 years ago. He is a nonsmoker and is unsure of his family history due to being adopted. Hx of HLP, right BBB, Vitamin d deficiency. COVID 19 in January 2022.  Objective     Vital Signs:   Vitals:    02/09/22 0753 02/09/22 0754 02/09/22 0755   BP: 155/87 152/83 153/85   BP Location: Right arm Left arm Left arm   Patient Position: Sitting Standing Sitting   Cuff Size: Adult Adult Adult   Pulse: 83 93 80   Resp:   18   Temp: 96.5 °F (35.8 °C) 96.5 °F (35.8 °C) 96.5 °F (35.8 °C)   TempSrc: Temporal Temporal Temporal   SpO2: 97% 96% 96%   Weight:   87.7 kg (193 lb 5 oz)   Height:   180.3 cm (71\")     Body mass index is 26.96 kg/m².  Physical Exam  Vitals and nursing note reviewed.   Constitutional:       Appearance: Normal appearance.   HENT:      Head: Normocephalic.   Eyes:      Pupils: Pupils are equal, round, and reactive to light.   Cardiovascular:      Rate and Rhythm: Normal rate and regular rhythm.      Pulses: Normal pulses.      Heart sounds: Normal heart sounds. No murmur heard.      Pulmonary:      Effort: Pulmonary effort is normal.      Breath sounds: Normal breath sounds.   Abdominal:      General: Bowel sounds are normal.      Palpations: Abdomen is soft.   Musculoskeletal:   "       General: Normal range of motion.      Cervical back: Normal range of motion.      Right lower leg: No edema.      Left lower leg: No edema.   Skin:     General: Skin is warm and dry.      Capillary Refill: Capillary refill takes less than 2 seconds.   Neurological:      Mental Status: He is alert and oriented to person, place, and time.   Psychiatric:         Mood and Affect: Mood normal.         Thought Content: Thought content normal.              Result Review  Data Reviewed:{ Labs  Result Review  Imaging  Med Tab  Media :23}     Lab Results   Component Value Date    GLUCOSE 145 (H) 08/05/2021    CALCIUM 9.3 08/05/2021     08/05/2021    K 4.2 08/05/2021    CO2 27.9 08/05/2021     08/05/2021    BUN 11 08/05/2021    CREATININE 1.02 08/05/2021    EGFRIFAFRI 90 08/05/2021    EGFRIFNONA 74 08/05/2021    BCR 10.8 08/05/2021    ANIONGAP 13.1 07/22/2019     Lab Results   Component Value Date    WBC 5.87 08/05/2021    HGB 14.9 08/05/2021    HCT 44.6 08/05/2021    MCV 92.1 08/05/2021     08/05/2021     EKG SR with RBBB at 83 bpm            Assessment and Plan {CC Problem List  Visit Diagnosis  ROS  Review (Popup)  Health Maintenance  Quality  BestPractice  Medications  SmartSets  SnapShot Encounters  Media :23}   1. Precordial pain  ruy score 0  - Adult Stress Echo W/ Cont or Stress Agent if Necessary Per Protocol; Future    2. Right bundle branch block (RBBB)    - Adult Stress Echo W/ Cont or Stress Agent if Necessary Per Protocol; Future    3. Dyslipidemia, goal LDL below 100  Diet controlled   - Adult Stress Echo W/ Cont or Stress Agent if Necessary Per Protocol; Future    4. CASE (dyspnea on exertion)  Personal history of COVID 19 January 2022  - Adult Stress Echo W/ Cont or Stress Agent if Necessary Per Protocol; Future      I spent 21 minutes caring for Riaz on this date of service. This time includes time spent by me in the following activities:preparing for the visit,  reviewing tests, obtaining and/or reviewing a separately obtained history, performing a medically appropriate examination and/or evaluation , counseling and educating the patient/family/caregiver, ordering medications, tests, or procedures, documenting information in the medical record and care coordination    Follow Up {Instructions Charge Capture  Follow-up Communications :23}   Return if symptoms worsen or fail to improve.    Patient was given instructions and counseling regarding his condition or for health maintenance advice. Please see specific information pulled into the AVS if appropriate.  Patient was instructed to call the [facility] with any questions, concerns, or worsening symptoms.

## 2022-02-14 ENCOUNTER — HOSPITAL ENCOUNTER (OUTPATIENT)
Dept: CARDIOLOGY | Facility: HOSPITAL | Age: 62
Discharge: HOME | End: 2022-02-14
Admitting: NURSE PRACTITIONER

## 2022-02-14 VITALS
WEIGHT: 193 LBS | BODY MASS INDEX: 27.02 KG/M2 | HEART RATE: 88 BPM | SYSTOLIC BLOOD PRESSURE: 114 MMHG | HEIGHT: 71 IN | DIASTOLIC BLOOD PRESSURE: 82 MMHG

## 2022-02-14 DIAGNOSIS — R06.09 DOE (DYSPNEA ON EXERTION): ICD-10-CM

## 2022-02-14 DIAGNOSIS — R07.2 PRECORDIAL PAIN: ICD-10-CM

## 2022-02-14 DIAGNOSIS — I45.10 RIGHT BUNDLE BRANCH BLOCK (RBBB): ICD-10-CM

## 2022-02-14 DIAGNOSIS — E78.5 DYSLIPIDEMIA, GOAL LDL BELOW 100: ICD-10-CM

## 2022-02-14 LAB
BH CV ECHO MEAS - AO MAX PG (FULL): 1.3 MMHG
BH CV ECHO MEAS - AO MAX PG: 5.9 MMHG
BH CV ECHO MEAS - AO MEAN PG (FULL): 0.99 MMHG
BH CV ECHO MEAS - AO MEAN PG: 3.3 MMHG
BH CV ECHO MEAS - AO ROOT AREA (BSA CORRECTED): 1.5
BH CV ECHO MEAS - AO ROOT AREA: 7.7 CM^2
BH CV ECHO MEAS - AO ROOT DIAM: 3.1 CM
BH CV ECHO MEAS - AO V2 MAX: 121.9 CM/SEC
BH CV ECHO MEAS - AO V2 MEAN: 84 CM/SEC
BH CV ECHO MEAS - AO V2 VTI: 22.5 CM
BH CV ECHO MEAS - ASC AORTA: 3.4 CM
BH CV ECHO MEAS - AVA(I,A): 2.7 CM^2
BH CV ECHO MEAS - AVA(I,D): 2.7 CM^2
BH CV ECHO MEAS - AVA(V,A): 2.9 CM^2
BH CV ECHO MEAS - AVA(V,D): 2.9 CM^2
BH CV ECHO MEAS - BSA(HAYCOCK): 2.1 M^2
BH CV ECHO MEAS - BSA: 2.1 M^2
BH CV ECHO MEAS - BZI_BMI: 26.9 KILOGRAMS/M^2
BH CV ECHO MEAS - BZI_METRIC_HEIGHT: 180.3 CM
BH CV ECHO MEAS - BZI_METRIC_WEIGHT: 87.5 KG
BH CV ECHO MEAS - EDV(CUBED): 57.2 ML
BH CV ECHO MEAS - EDV(MOD-SP4): 66 ML
BH CV ECHO MEAS - EDV(TEICH): 64 ML
BH CV ECHO MEAS - EF(CUBED): 63.6 %
BH CV ECHO MEAS - EF(MOD-SP4): 53 %
BH CV ECHO MEAS - EF(TEICH): 55.8 %
BH CV ECHO MEAS - ESV(CUBED): 20.8 ML
BH CV ECHO MEAS - ESV(MOD-SP4): 31 ML
BH CV ECHO MEAS - ESV(TEICH): 28.3 ML
BH CV ECHO MEAS - FS: 28.6 %
BH CV ECHO MEAS - IVS/LVPW: 1.2
BH CV ECHO MEAS - IVSD: 1.1 CM
BH CV ECHO MEAS - LA DIMENSION: 3.1 CM
BH CV ECHO MEAS - LA/AO: 1
BH CV ECHO MEAS - LAD MAJOR: 4.3 CM
BH CV ECHO MEAS - LAT PEAK E' VEL: 7.3 CM/SEC
BH CV ECHO MEAS - LATERAL E/E' RATIO: 10.8
BH CV ECHO MEAS - LV DIASTOLIC VOL/BSA (35-75): 31.8 ML/M^2
BH CV ECHO MEAS - LV IVRT: 0.09 SEC
BH CV ECHO MEAS - LV MASS(C)D: 120.6 GRAMS
BH CV ECHO MEAS - LV MASS(C)DI: 58.1 GRAMS/M^2
BH CV ECHO MEAS - LV MAX PG: 4.7 MMHG
BH CV ECHO MEAS - LV MEAN PG: 2.3 MMHG
BH CV ECHO MEAS - LV SYSTOLIC VOL/BSA (12-30): 14.9 ML/M^2
BH CV ECHO MEAS - LV V1 MAX: 108 CM/SEC
BH CV ECHO MEAS - LV V1 MEAN: 69 CM/SEC
BH CV ECHO MEAS - LV V1 VTI: 18.7 CM
BH CV ECHO MEAS - LVIDD: 3.9 CM
BH CV ECHO MEAS - LVIDS: 2.8 CM
BH CV ECHO MEAS - LVLD AP4: 7.7 CM
BH CV ECHO MEAS - LVLS AP4: 6.8 CM
BH CV ECHO MEAS - LVOT AREA (M): 3.1 CM^2
BH CV ECHO MEAS - LVOT AREA: 3.3 CM^2
BH CV ECHO MEAS - LVOT DIAM: 2 CM
BH CV ECHO MEAS - LVPWD: 0.92 CM
BH CV ECHO MEAS - MED PEAK E' VEL: 5.7 CM/SEC
BH CV ECHO MEAS - MEDIAL E/E' RATIO: 13.9
BH CV ECHO MEAS - MV A MAX VEL: 113.9 CM/SEC
BH CV ECHO MEAS - MV DEC TIME: 0.16 SEC
BH CV ECHO MEAS - MV E MAX VEL: 81 CM/SEC
BH CV ECHO MEAS - MV E/A: 0.71
BH CV ECHO MEAS - MV MAX PG: 5.7 MMHG
BH CV ECHO MEAS - MV MEAN PG: 2.2 MMHG
BH CV ECHO MEAS - MV V2 MAX: 118.9 CM/SEC
BH CV ECHO MEAS - MV V2 MEAN: 71.8 CM/SEC
BH CV ECHO MEAS - MV V2 VTI: 21.2 CM
BH CV ECHO MEAS - MVA(VTI): 2.9 CM^2
BH CV ECHO MEAS - PA ACC SLOPE: 486.3 CM/SEC^2
BH CV ECHO MEAS - PA ACC TIME: 0.15 SEC
BH CV ECHO MEAS - PA MAX PG: 6.2 MMHG
BH CV ECHO MEAS - PA PR(ACCEL): 9.3 MMHG
BH CV ECHO MEAS - PA V2 MAX: 124.9 CM/SEC
BH CV ECHO MEAS - SI(AO): 82.8 ML/M^2
BH CV ECHO MEAS - SI(CUBED): 17.5 ML/M^2
BH CV ECHO MEAS - SI(LVOT): 29.6 ML/M^2
BH CV ECHO MEAS - SI(MOD-SP4): 16.8 ML/M^2
BH CV ECHO MEAS - SI(TEICH): 17.2 ML/M^2
BH CV ECHO MEAS - SV(AO): 171.9 ML
BH CV ECHO MEAS - SV(CUBED): 36.4 ML
BH CV ECHO MEAS - SV(LVOT): 61.5 ML
BH CV ECHO MEAS - SV(MOD-SP4): 35 ML
BH CV ECHO MEAS - SV(TEICH): 35.8 ML
BH CV ECHO MEAS - TAPSE (>1.6): 2.3 CM
BH CV ECHO MEASUREMENTS AVERAGE E/E' RATIO: 12.46
BH CV STRESS BP STAGE 1: NORMAL
BH CV STRESS BP STAGE 2: NORMAL
BH CV STRESS BP STAGE 3: NORMAL
BH CV STRESS DURATION MIN STAGE 1: 3
BH CV STRESS DURATION MIN STAGE 2: 3
BH CV STRESS DURATION MIN STAGE 3: 2
BH CV STRESS DURATION SEC STAGE 1: 0
BH CV STRESS DURATION SEC STAGE 2: 0
BH CV STRESS DURATION SEC STAGE 3: 41
BH CV STRESS ECHO POST STRESS EJECTION FRACTION EF: 75 %
BH CV STRESS GRADE STAGE 1: 10
BH CV STRESS GRADE STAGE 2: 12
BH CV STRESS GRADE STAGE 3: 14
BH CV STRESS HR STAGE 1: 125
BH CV STRESS HR STAGE 2: 144
BH CV STRESS HR STAGE 3: 171
BH CV STRESS METS STAGE 1: 5
BH CV STRESS METS STAGE 2: 7.5
BH CV STRESS METS STAGE 3: 10
BH CV STRESS O2 STAGE 1: 100
BH CV STRESS O2 STAGE 3: 99
BH CV STRESS PROTOCOL 1: NORMAL
BH CV STRESS RECOVERY BP: NORMAL MMHG
BH CV STRESS RECOVERY HR: 118 BPM
BH CV STRESS RECOVERY O2: 98 %
BH CV STRESS SPEED STAGE 1: 1.7
BH CV STRESS SPEED STAGE 2: 2.5
BH CV STRESS SPEED STAGE 3: 3.4
BH CV STRESS STAGE 1: 1
BH CV STRESS STAGE 2: 2
BH CV STRESS STAGE 3: 3
BH CV VAS BP LEFT ARM: NORMAL MMHG
BH CV XLRA - RV BASE: 2.8 CM
BH CV XLRA - RV LENGTH: 7.7 CM
BH CV XLRA - RV MID: 1.9 CM
IVRT: 92 MSEC
LV EF 2D ECHO EST: 60 %
MAXIMAL PREDICTED HEART RATE: 158 BPM
PERCENT MAX PREDICTED HR: 108.23 %
STRESS BASELINE BP: NORMAL MMHG
STRESS BASELINE HR: 88 BPM
STRESS O2 SAT REST: 98 %
STRESS PERCENT HR: 127 %
STRESS POST ESTIMATED WORKLOAD: 10.1 METS
STRESS POST EXERCISE DUR MIN: 8 MIN
STRESS POST EXERCISE DUR SEC: 41 SEC
STRESS POST O2 SAT PEAK: 99 %
STRESS POST PEAK BP: NORMAL MMHG
STRESS POST PEAK HR: 171 BPM
STRESS TARGET HR: 134 BPM

## 2022-02-14 PROCEDURE — 93350 STRESS TTE ONLY: CPT | Performed by: INTERNAL MEDICINE

## 2022-02-14 PROCEDURE — 93325 DOPPLER ECHO COLOR FLOW MAPG: CPT

## 2022-02-14 PROCEDURE — 93017 CV STRESS TEST TRACING ONLY: CPT

## 2022-02-14 PROCEDURE — 93350 STRESS TTE ONLY: CPT

## 2022-02-14 PROCEDURE — 93018 CV STRESS TEST I&R ONLY: CPT | Performed by: INTERNAL MEDICINE

## 2022-02-14 PROCEDURE — 93352 ADMIN ECG CONTRAST AGENT: CPT | Performed by: INTERNAL MEDICINE

## 2022-02-14 PROCEDURE — 25010000002 SULFUR HEXAFLUORIDE MICROSPH 60.7-25 MG RECONSTITUTED SUSPENSION: Performed by: NURSE PRACTITIONER

## 2022-02-14 PROCEDURE — 93320 DOPPLER ECHO COMPLETE: CPT

## 2022-02-14 RX ADMIN — SULFUR HEXAFLUORIDE 5 ML: KIT at 14:32

## 2022-02-16 NOTE — PROGRESS NOTES
I have reviewed your nuclear stress test results.  Your results are acceptable.   There is no evidence of blockage in the arteries of your heart.  Your heart contracts normally.    As you probably know stress testing is not perfect.  There is an 85% likelihood the nuclear stress test has given us accurate results. There are both false negatives and false positives with any type of stress testing.  If you continue to have symptoms please contact my office or let your primary care provider know.    Please see your primary care provider on a regular basis.  It is important to have good blood pressure, cholesterol, and blood glucose control, as well as maintaining a healthy weight.  I encourage you to increase your daily physical exercise and modify your diet for weight loss.  An outpatient dietitian consult is available if needed.

## 2023-03-08 ENCOUNTER — LAB (OUTPATIENT)
Dept: LAB | Facility: HOSPITAL | Age: 63
End: 2023-03-08
Payer: COMMERCIAL

## 2023-03-08 ENCOUNTER — OFFICE VISIT (OUTPATIENT)
Dept: FAMILY MEDICINE CLINIC | Facility: CLINIC | Age: 63
End: 2023-03-08
Payer: COMMERCIAL

## 2023-03-08 VITALS
DIASTOLIC BLOOD PRESSURE: 90 MMHG | BODY MASS INDEX: 26.91 KG/M2 | HEART RATE: 81 BPM | OXYGEN SATURATION: 95 % | SYSTOLIC BLOOD PRESSURE: 130 MMHG | WEIGHT: 192.2 LBS | HEIGHT: 71 IN

## 2023-03-08 DIAGNOSIS — E78.5 DYSLIPIDEMIA, GOAL LDL BELOW 100: Chronic | ICD-10-CM

## 2023-03-08 DIAGNOSIS — L30.1 DYSHIDROTIC ECZEMA: ICD-10-CM

## 2023-03-08 DIAGNOSIS — Z00.00 WELL ADULT EXAM: Primary | ICD-10-CM

## 2023-03-08 DIAGNOSIS — Z12.11 SCREENING FOR MALIGNANT NEOPLASM OF COLON: ICD-10-CM

## 2023-03-08 DIAGNOSIS — Z12.5 SCREENING FOR MALIGNANT NEOPLASM OF PROSTATE: ICD-10-CM

## 2023-03-08 DIAGNOSIS — N40.1 BPH WITH OBSTRUCTION/LOWER URINARY TRACT SYMPTOMS: ICD-10-CM

## 2023-03-08 DIAGNOSIS — E11.65 TYPE 2 DIABETES MELLITUS WITH HYPERGLYCEMIA, WITHOUT LONG-TERM CURRENT USE OF INSULIN: ICD-10-CM

## 2023-03-08 DIAGNOSIS — Z23 NEED FOR VACCINATION: ICD-10-CM

## 2023-03-08 DIAGNOSIS — N13.8 BPH WITH OBSTRUCTION/LOWER URINARY TRACT SYMPTOMS: ICD-10-CM

## 2023-03-08 DIAGNOSIS — Z00.00 WELL ADULT EXAM: ICD-10-CM

## 2023-03-08 PROBLEM — M53.3 COCCYX PAIN: Status: RESOLVED | Noted: 2020-12-01 | Resolved: 2023-03-08

## 2023-03-08 LAB
ALBUMIN SERPL-MCNC: 5.2 G/DL (ref 3.5–5.2)
ALBUMIN/GLOB SERPL: 2.2 G/DL
ALP SERPL-CCNC: 68 U/L (ref 39–117)
ALT SERPL-CCNC: 40 U/L (ref 1–41)
AST SERPL-CCNC: 24 U/L (ref 1–40)
BILIRUB SERPL-MCNC: 0.6 MG/DL (ref 0–1.2)
BUN SERPL-MCNC: 16 MG/DL (ref 8–23)
BUN/CREAT SERPL: 15.4 (ref 7–25)
CALCIUM SERPL-MCNC: 9.3 MG/DL (ref 8.6–10.5)
CHLORIDE SERPL-SCNC: 102 MMOL/L (ref 98–107)
CHOLEST SERPL-MCNC: 212 MG/DL (ref 0–200)
CO2 SERPL-SCNC: 27.4 MMOL/L (ref 22–29)
CREAT SERPL-MCNC: 1.04 MG/DL (ref 0.76–1.27)
EGFRCR SERPLBLD CKD-EPI 2021: 80.7 ML/MIN/1.73
ERYTHROCYTE [DISTWIDTH] IN BLOOD BY AUTOMATED COUNT: 12.3 % (ref 12.3–15.4)
EXPIRATION DATE: NORMAL
GLOBULIN SER CALC-MCNC: 2.4 GM/DL
GLUCOSE SERPL-MCNC: 138 MG/DL (ref 65–99)
HBA1C MFR BLD: 6.9 %
HCT VFR BLD AUTO: 43.8 % (ref 37.5–51)
HDLC SERPL-MCNC: 39 MG/DL (ref 40–60)
HGB BLD-MCNC: 15.2 G/DL (ref 13–17.7)
LDLC SERPL CALC-MCNC: 155 MG/DL (ref 0–100)
Lab: NORMAL
MCH RBC QN AUTO: 31.5 PG (ref 26.6–33)
MCHC RBC AUTO-ENTMCNC: 34.7 G/DL (ref 31.5–35.7)
MCV RBC AUTO: 90.9 FL (ref 79–97)
PLATELET # BLD AUTO: 205 10*3/MM3 (ref 140–450)
POC CREATININE URINE: 17.7
POC MICROALBUMIN URINE: 10
POTASSIUM SERPL-SCNC: 4.2 MMOL/L (ref 3.5–5.2)
PROT SERPL-MCNC: 7.6 G/DL (ref 6–8.5)
PSA SERPL-MCNC: 1.34 NG/ML (ref 0–4)
RBC # BLD AUTO: 4.82 10*6/MM3 (ref 4.14–5.8)
SODIUM SERPL-SCNC: 137 MMOL/L (ref 136–145)
TRIGL SERPL-MCNC: 98 MG/DL (ref 0–150)
TSH SERPL DL<=0.005 MIU/L-ACNC: 1.86 UIU/ML (ref 0.27–4.2)
VLDLC SERPL CALC-MCNC: 18 MG/DL (ref 5–40)
WBC # BLD AUTO: 6 10*3/MM3 (ref 3.4–10.8)

## 2023-03-08 PROCEDURE — 99396 PREV VISIT EST AGE 40-64: CPT | Performed by: FAMILY MEDICINE

## 2023-03-08 PROCEDURE — 90471 IMMUNIZATION ADMIN: CPT | Performed by: FAMILY MEDICINE

## 2023-03-08 PROCEDURE — 83036 HEMOGLOBIN GLYCOSYLATED A1C: CPT | Performed by: FAMILY MEDICINE

## 2023-03-08 PROCEDURE — 82044 UR ALBUMIN SEMIQUANTITATIVE: CPT | Performed by: FAMILY MEDICINE

## 2023-03-08 PROCEDURE — 90715 TDAP VACCINE 7 YRS/> IM: CPT | Performed by: FAMILY MEDICINE

## 2023-03-08 RX ORDER — TAMSULOSIN HYDROCHLORIDE 0.4 MG/1
1 CAPSULE ORAL DAILY
Qty: 30 CAPSULE | Refills: 2 | Status: SHIPPED | OUTPATIENT
Start: 2023-03-08

## 2023-03-08 RX ORDER — METFORMIN HYDROCHLORIDE 500 MG/1
500 TABLET, EXTENDED RELEASE ORAL
Qty: 30 TABLET | Refills: 2 | Status: SHIPPED | OUTPATIENT
Start: 2023-03-08

## 2023-03-08 NOTE — PROGRESS NOTES
"Chief Complaint  Well adult exam (Fasting/Arthritis pain worsening in hands/Prostate issue-more trouble urinating ) and Annual Exam    Subjective          Riaz Luevano presents to [facility] for   History of Present Illness       Wife cooks meals at night. At lunch time he eats out such as fast food.   Goes out on the weekend. Usually drink water or unsweet tea. Not a lot of physical activity lately. In nicer weather he works in the yard. No exercise routine.     Knuckles are increasing knots in hands. Morning stiffness. He has tried OTC osteobiflex.     Weaker urinary stream noticeable in the morning for 1 year. A few minutes later he needs to urinate again.  Urinary dribbling. No urinary incontinence. Nocturia x1.     Occasional foot itching. He has had dry skin on feet.    PHQ-2/PHQ-9 Depression Screening 3/8/2023   Retired PHQ-9 Total Score -   Retired Total Score -   Little Interest or Pleasure in Doing Things 0-->not at all   Feeling Down, Depressed or Hopeless 0-->not at all   PHQ-9: Brief Depression Severity Measure Score 0       He is scheduled for colonoscopy in April.     Objective   Vital Signs:   Vitals:    03/08/23 0759   BP: 130/90   Pulse: 81   SpO2: 95%   Weight: 87.2 kg (192 lb 3.2 oz)   Height: 180.3 cm (70.98\")     Body mass index is 26.82 kg/m².    BMI is >= 25 and <30. (Overweight) The following options were offered after discussion;: nutrition counseling/recommendations          Physical Exam  Constitutional:       General: He is not in acute distress.     Appearance: He is overweight.   HENT:      Right Ear: Tympanic membrane and ear canal normal.      Left Ear: Tympanic membrane and ear canal normal.   Eyes:      General:         Right eye: No discharge.         Left eye: No discharge.      Conjunctiva/sclera: Conjunctivae normal.   Neck:      Thyroid: No thyromegaly.   Cardiovascular:      Rate and Rhythm: Normal rate and regular rhythm.      Pulses:          "  Dorsalis pedis pulses are 2+ on the right side and 2+ on the left side.        Posterior tibial pulses are 2+ on the right side and 2+ on the left side.   Pulmonary:      Effort: Pulmonary effort is normal.      Breath sounds: Normal breath sounds.   Abdominal:      Palpations: Abdomen is soft. There is no hepatomegaly.      Tenderness: There is no abdominal tenderness.   Genitourinary:     Prostate: Enlarged. Not tender and no nodules present.      Rectum: No mass, tenderness, anal fissure, external hemorrhoid or internal hemorrhoid.   Musculoskeletal:      Cervical back: Neck supple.        Feet:    Feet:      Right foot:      Protective Sensation: 6 sites tested. 6 sites sensed.      Skin integrity: Callus and dry skin present. No ulcer, blister, skin breakdown or erythema.      Left foot:      Protective Sensation: 6 sites tested. 6 sites sensed.      Skin integrity: Dry skin present. No ulcer, blister, skin breakdown, erythema or callus.      Comments: Diabetic Foot Exam Performed  Monofilament Test Performed        Lymphadenopathy:      Head:      Right side of head: No submandibular, preauricular or posterior auricular adenopathy.      Left side of head: No submandibular, preauricular or posterior auricular adenopathy.      Cervical: No cervical adenopathy.   Skin:     General: Skin is warm.   Neurological:      Mental Status: He is alert and oriented to person, place, and time.   Psychiatric:         Mood and Affect: Mood normal.         Behavior: Behavior normal.         Thought Content: Thought content normal.         Judgment: Judgment normal.        Result Review :            Adult Stress Echo W/ Cont or Stress Agent if Necessary Per Protocol (02/14/2022 14:35)    Results for orders placed or performed in visit on 03/08/23   POC Glycosylated Hemoglobin (Hb A1C)    Specimen: Blood   Result Value Ref Range    Hemoglobin A1C 6.9 %    Lot Number [id number]     Expiration Date 11/29/2024    POC Microalbumin     Specimen: Urine   Result Value Ref Range    Microalbumin, Urine 10     Creatinine, Urine 17.7            Immunization History   Administered Date(s) Administered   • COVID-19 (PFIZER) BIVALENT BOOSTER 12+YRS 10/28/2022   • COVID-19 (PFIZER) PURPLE CAP 03/04/2021, 03/26/2021, 10/29/2021   • Tdap 03/08/2023       Health Maintenance   Topic Date Due   • Pneumococcal Vaccine 0-64 (1 - PCV) Never done   • ZOSTER VACCINE (1 of 2) Never done   • HEPATITIS C SCREENING  Never done   • DIABETIC FOOT EXAM  Never done   • DIABETIC EYE EXAM  Never done   • COLORECTAL CANCER SCREENING  06/08/2022   • INFLUENZA VACCINE  Never done   • ANNUAL PHYSICAL  08/05/2022   • LIPID PANEL  08/05/2022   • HEMOGLOBIN A1C  06/08/2023   • URINE MICROALBUMIN  03/08/2024   • TDAP/TD VACCINES (2 - Td or Tdap) 03/08/2033   • COVID-19 Vaccine  Completed            Assessment and Plan    Diagnoses and all orders for this visit:    1. Well adult exam (Primary)  -     CBC (No Diff); Future  -     Comprehensive Metabolic Panel; Future  -     Lipid Panel; Future  -     TSH; Future  -     PSA Screen; Future  Check fasting labs today.  2. Type 2 diabetes mellitus with hyperglycemia, without long-term current use of insulin (HCC)  -     POC Glycosylated Hemoglobin (Hb A1C)  -     POC Microalbumin  -     Comprehensive Metabolic Panel; Future  -     Lipid Panel; Future  -     metFORMIN ER (GLUCOPHAGE-XR) 500 MG 24 hr tablet; Take 1 tablet by mouth Daily With Breakfast.  Dispense: 30 tablet; Refill: 2  Uncontrolled.  Initiate metformin.  Monitor for side effects.  If he experiences GI problems then I would switch to Jardiance and discussed benefits, mechanism of action, and side effects.  Recommend diabetic diet along with weight loss.  Recheck in 3 months.  3. Screening for malignant neoplasm of prostate  -     PSA Screen; Future    4. Screening for malignant neoplasm of colon  He has a history of colon polyps with last colonoscopy in 2017 and he is  scheduled for follow-up in April.  5. Need for vaccination  -     Tdap Vaccine Greater Than or Equal To 6yo IM  Recommend Shingrix vaccine at local pharmacy and handout also provided.  Recommend Prevnar 20 but he declined today.  6. Dyslipidemia, goal LDL below 100  Not on medication.  Check labs.  7. Dyshidrotic eczema  Recommend Lac-hydrin lotion over the counter to help dry skin of feet.   8. BPH with obstruction/lower urinary tract symptoms  -     tamsulosin (FLOMAX) 0.4 MG capsule 24 hr capsule; Take 1 capsule by mouth Daily.  Dispense: 30 capsule; Refill: 2  New.  Recommend starting tamsulosin.  Advised to take nightly and cautioned on dizziness.  Reassess at follow-up.    Recommend Lac-hydrin lotion over the counter to help dry skin of feet.     Counseling/anticipatory guidance: Nutrition, physical activity,  immunizations, screenings      Follow Up   Return in about 3 months (around 6/8/2023) for Office visit diabetes.  Patient was given instructions and counseling regarding his condition or for health maintenance advice. Please see specific information pulled into the AVS if appropriate.      Electronically signed by Sunita Servin MD, 03/08/23, 8:54 AM EST.

## 2023-03-09 ENCOUNTER — TELEPHONE (OUTPATIENT)
Dept: FAMILY MEDICINE CLINIC | Facility: CLINIC | Age: 63
End: 2023-03-09
Payer: COMMERCIAL

## 2023-03-09 NOTE — TELEPHONE ENCOUNTER
Cholesterol is elevated and I recommend starting either atorvastatin or rosuvastatin at night to prevent heart attack and stroke.  If he is in agreement I will send a prescription to his pharmacy.    The test results show normal prostate cancer screening PSA.  Normal thyroid function.  Total cholesterol and LDL bad cholesterol are both elevated.  Goal LDL less than 100.  I recommend initiating statin cholesterol-lowering medication to reduce heart attack and stroke risk.  Blood sugar is elevated due to diabetes.  Normal kidney function, electrolytes, liver function.  Normal white blood cell count, no anemia, and normal platelets.

## 2023-03-09 NOTE — TELEPHONE ENCOUNTER
Pt states that he has already read his [employer] message and does not have any questions.

## 2023-05-19 ENCOUNTER — OFFICE VISIT (OUTPATIENT)
Dept: FAMILY MEDICINE CLINIC | Facility: CLINIC | Age: 63
End: 2023-05-19
Payer: COMMERCIAL

## 2023-05-19 ENCOUNTER — LAB (OUTPATIENT)
Dept: LAB | Facility: HOSPITAL | Age: 63
End: 2023-05-19
Payer: COMMERCIAL

## 2023-05-19 VITALS
HEIGHT: 71 IN | OXYGEN SATURATION: 97 % | HEART RATE: 86 BPM | WEIGHT: 192 LBS | SYSTOLIC BLOOD PRESSURE: 126 MMHG | DIASTOLIC BLOOD PRESSURE: 82 MMHG | BODY MASS INDEX: 26.88 KG/M2

## 2023-05-19 DIAGNOSIS — I10 PRIMARY HYPERTENSION: ICD-10-CM

## 2023-05-19 DIAGNOSIS — E11.65 TYPE 2 DIABETES MELLITUS WITH HYPERGLYCEMIA, WITHOUT LONG-TERM CURRENT USE OF INSULIN: ICD-10-CM

## 2023-05-19 DIAGNOSIS — E11.65 TYPE 2 DIABETES MELLITUS WITH HYPERGLYCEMIA, WITHOUT LONG-TERM CURRENT USE OF INSULIN: Primary | ICD-10-CM

## 2023-05-19 LAB — HBA1C MFR BLD: 6.4 % (ref 4.8–5.6)

## 2023-05-19 PROCEDURE — 99214 OFFICE O/P EST MOD 30 MIN: CPT | Performed by: FAMILY MEDICINE

## 2023-05-19 RX ORDER — LISINOPRIL 10 MG/1
10 TABLET ORAL DAILY
Qty: 30 TABLET | Refills: 1 | Status: SHIPPED | OUTPATIENT
Start: 2023-05-19

## 2023-05-19 NOTE — PROGRESS NOTES
"Chief Complaint  Diabetes (3 mth follow up) and Hypertension (Had colonoscopy and BP was elevated and was given medications to keep it calm.)    Subjective        Riaz Luevano presents to [facility]  Diabetes  He presents for his follow-up diabetic visit. He has type 2 diabetes mellitus. Pertinent negatives for hypoglycemia include no headaches or sweats. Pertinent negatives for diabetes include no blurred vision and no chest pain. Current diabetic treatment includes oral agent (monotherapy).   Hypertension  This is a new problem. The current episode started more than 1 month ago. The problem has been waxing and waning since onset. The problem is uncontrolled. Pertinent negatives include no anxiety, blurred vision, chest pain, headaches, malaise/fatigue, neck pain, orthopnea, palpitations, peripheral edema, PND, shortness of breath or sweats. There are no associated agents to hypertension. Risk factors for coronary artery disease include diabetes mellitus and dyslipidemia. There are no compliance problems.    Answers for HPI/ROS submitted by the patient on 5/16/2023  What is the primary reason for your visit?: High Blood Pressure  BP was 170 at time of colonoscopy. He checks home blood pressure. He doesn't feel any symptoms when its high.     Taking metformin in the morning. No GI upset. Started drinking black coffee, cutting back from creamer and sugar.       Objective   Vital Signs:  /82   Pulse 86   Ht 180.3 cm (70.98\")   Wt 87.1 kg (192 lb)   SpO2 97%   BMI 26.79 kg/m²   Estimated body mass index is 26.79 kg/m² as calculated from the following:    Height as of this encounter: 180.3 cm (70.98\").    Weight as of this encounter: 87.1 kg (192 lb).             Physical Exam  Vitals reviewed.   Constitutional:       General: He is not in acute distress.     Appearance: He is overweight. He is not ill-appearing.   Cardiovascular:      Rate and Rhythm: Normal rate and regular " rhythm.   Pulmonary:      Effort: Pulmonary effort is normal.      Breath sounds: Normal breath sounds.   Neurological:      Mental Status: He is alert.        Result Review :  The following data was reviewed by: Sunita Servin MD on 05/19/2023:  Common labs        3/8/2023    08:10 3/8/2023    08:34   Common Labs   Glucose  138     BUN  16     Creatinine  1.04     Sodium  137     Potassium  4.2     Chloride  102     Calcium  9.3     Total Protein  7.6     Albumin  5.2     Total Bilirubin  0.6     Alkaline Phosphatase  68     AST (SGOT)  24     ALT (SGPT)  40     WBC  6.00     Hemoglobin  15.2     Hematocrit  43.8     Platelets  205     Total Cholesterol  212     Triglycerides  98     HDL Cholesterol  39     LDL Cholesterol   155     Hemoglobin A1C 6.9      PSA  1.340       A1C Last 3 Results        3/8/2023    08:10   HGBA1C Last 3 Results   Hemoglobin A1C 6.9         SCANNED - COLONOSCOPY (04/17/2023)             Assessment and Plan   Diagnoses and all orders for this visit:    1. Type 2 diabetes mellitus with hyperglycemia, without long-term current use of insulin (Primary)  -     lisinopril (PRINIVIL,ZESTRIL) 10 MG tablet; Take 1 tablet by mouth Daily.  Dispense: 30 tablet; Refill: 1  -     Hemoglobin A1c; Future  Initiated on metformin  mg at last visit in March.  Check follow-up A1c today.  A1c goal less than 6.5 and if still elevated will plan to increase to 2 pills/day.  Continue nutrition changes.  2. Primary hypertension  -     lisinopril (PRINIVIL,ZESTRIL) 10 MG tablet; Take 1 tablet by mouth Daily.  Dispense: 30 tablet; Refill: 1  New. Home blood pressure log reviewed with highest readings in 150s over 90s.  Start ACE inhibitor to lower blood pressure as well as offer kidney protection with diabetes.  Monitor for side effects.  Reassess next month or sooner if problems.           Follow Up   Return in about 1 month (around 6/19/2023) for Office visit HTN .  Patient was given instructions and  counseling regarding his condition or for health maintenance advice. Please see specific information pulled into the AVS if appropriate.     Electronically signed by Sunita Servin MD, 05/19/23, 9:47 AM EDT.

## 2023-08-23 ENCOUNTER — OFFICE VISIT (OUTPATIENT)
Age: 63
End: 2023-08-23
Payer: COMMERCIAL

## 2023-08-23 VITALS
BODY MASS INDEX: 26.71 KG/M2 | TEMPERATURE: 98.2 F | HEIGHT: 71 IN | OXYGEN SATURATION: 96 % | SYSTOLIC BLOOD PRESSURE: 166 MMHG | DIASTOLIC BLOOD PRESSURE: 94 MMHG | HEART RATE: 122 BPM | WEIGHT: 190.8 LBS

## 2023-08-23 DIAGNOSIS — I10 HYPERTENSION, UNSPECIFIED TYPE: ICD-10-CM

## 2023-08-23 DIAGNOSIS — U07.1 COVID-19: ICD-10-CM

## 2023-08-23 DIAGNOSIS — R05.1 ACUTE COUGH: Primary | ICD-10-CM

## 2023-08-23 LAB
EXPIRATION DATE: ABNORMAL
FLUAV AG UPPER RESP QL IA.RAPID: NOT DETECTED
FLUBV AG UPPER RESP QL IA.RAPID: NOT DETECTED
INTERNAL CONTROL: ABNORMAL
Lab: ABNORMAL
SARS-COV-2 AG UPPER RESP QL IA.RAPID: DETECTED

## 2023-08-23 PROCEDURE — 99213 OFFICE O/P EST LOW 20 MIN: CPT | Performed by: NURSE PRACTITIONER

## 2023-08-23 PROCEDURE — 87428 SARSCOV & INF VIR A&B AG IA: CPT | Performed by: NURSE PRACTITIONER

## 2023-08-23 RX ORDER — LISINOPRIL 2.5 MG/1
2.5 TABLET ORAL DAILY
Qty: 30 TABLET | Refills: 0 | Status: SHIPPED | OUTPATIENT
Start: 2023-08-23

## 2023-08-23 NOTE — PROGRESS NOTES
"Chief Complaint  Cough and Headache    Subjective        Riaz Luevano presents to [facility]  History of Present Illness  Pt is here today with cough, headache, and nasal drainage that started last night. No fevers at this point. He has tested positive for covid in the past. He is fully vaccinated.     Pt BP and HR are elevated today. He reports a history of elevated BP readings. He tried Lisinopril 10 Mg and BP was dropping too low. We discussed starting a lower dose and monitoring his response. It is likely partially a response to Covid at this time. However, this appears to be a repeated concern for him. Advised him to monitor his symptoms and if he is feeling short of breath or having other concerning symptoms he will need to be evaluated.     Objective   Vital Signs:  /94 (BP Location: Left arm, Patient Position: Sitting, Cuff Size: Adult)   Pulse (!) 122   Temp 98.2 øF (36.8 øC) (Temporal)   Ht 180.3 cm (70.98\")   Wt 86.5 kg (190 lb 12.8 oz)   SpO2 96%   BMI 26.63 kg/mý   Estimated body mass index is 26.63 kg/mý as calculated from the following:    Height as of this encounter: 180.3 cm (70.98\").    Weight as of this encounter: 86.5 kg (190 lb 12.8 oz).       Physical Exam  Vitals reviewed.   Constitutional:       Appearance: Normal appearance.   HENT:      Right Ear: Tympanic membrane, ear canal and external ear normal.      Left Ear: Tympanic membrane, ear canal and external ear normal.      Nose: Congestion and rhinorrhea present.   Eyes:      Conjunctiva/sclera: Conjunctivae normal.   Cardiovascular:      Rate and Rhythm: Regular rhythm. Tachycardia present.      Heart sounds: Normal heart sounds.   Pulmonary:      Effort: Pulmonary effort is normal.      Breath sounds: Normal breath sounds.   Musculoskeletal:         General: Normal range of motion.      Cervical back: Normal range of motion.   Skin:     General: Skin is warm.   Neurological:      Mental Status: " He is alert and oriented to person, place, and time.   Psychiatric:         Mood and Affect: Mood normal.         Behavior: Behavior normal.         Thought Content: Thought content normal.      Result Review :                 Assessment and Plan   Diagnoses and all orders for this visit:    1. Acute cough (Primary)  -     POCT SARS-CoV-2 Antigen JORDAN    2. COVID-19  -     Nirmatrelvir&Ritonavir 300/100 (PAXLOVID) 20 x 150 MG & 10 x 100MG tablet therapy pack tablet; Take 3 tablets by mouth 2 (Two) Times a Day for 5 days.  Dispense: 30 tablet; Refill: 0    3. Hypertension, unspecified type  -     lisinopril (PRINIVIL,ZESTRIL) 2.5 MG tablet; Take 1 tablet by mouth Daily.  Dispense: 30 tablet; Refill: 0             Follow Up   No follow-ups on file.  Patient was given instructions and counseling regarding his condition or for health maintenance advice. Please see specific information pulled into the AVS if appropriate.

## 2023-08-30 ENCOUNTER — OFFICE VISIT (OUTPATIENT)
Age: 63
End: 2023-08-30
Payer: COMMERCIAL

## 2023-08-30 VITALS
HEART RATE: 77 BPM | SYSTOLIC BLOOD PRESSURE: 118 MMHG | OXYGEN SATURATION: 98 % | BODY MASS INDEX: 26.32 KG/M2 | WEIGHT: 188 LBS | DIASTOLIC BLOOD PRESSURE: 70 MMHG | HEIGHT: 71 IN

## 2023-08-30 DIAGNOSIS — E11.65 TYPE 2 DIABETES MELLITUS WITH HYPERGLYCEMIA, WITHOUT LONG-TERM CURRENT USE OF INSULIN: Primary | ICD-10-CM

## 2023-08-30 DIAGNOSIS — I10 PRIMARY HYPERTENSION: ICD-10-CM

## 2023-08-30 DIAGNOSIS — E66.3 OVERWEIGHT WITH BODY MASS INDEX (BMI) OF 26 TO 26.9 IN ADULT: ICD-10-CM

## 2023-08-30 LAB
EXPIRATION DATE: NORMAL
HBA1C MFR BLD: 6.7 %
Lab: NORMAL

## 2023-08-30 RX ORDER — LISINOPRIL 2.5 MG/1
2.5 TABLET ORAL DAILY
Qty: 90 TABLET | Refills: 1 | Status: SHIPPED | OUTPATIENT
Start: 2023-08-30

## 2023-08-30 RX ORDER — METFORMIN HYDROCHLORIDE 500 MG/1
1000 TABLET, EXTENDED RELEASE ORAL
COMMUNITY

## 2023-08-30 NOTE — PROGRESS NOTES
"Chief Complaint  Shoulder Pain (Pt states he's been going to physical therapy and it has been going well, he will be done next Tuesday. ) and Diabetes    Subjective        Riaz Luevano presents to [facility]  Diabetes  He presents for his follow-up diabetic visit. He has type 2 diabetes mellitus. Current diabetic treatment includes oral agent (monotherapy).     Recent COVID infection treated with Paxlovid.    He is not testing blood sugar at home. Tries low carb diet.     Less pain in left shoulder. He finishes PT next week. Not much home exercises.         Objective   Vital Signs:  /70   Pulse 77   Ht 180.3 cm (70.98\")   Wt 85.3 kg (188 lb)   SpO2 98%   BMI 26.24 kg/mý   Estimated body mass index is 26.24 kg/mý as calculated from the following:    Height as of this encounter: 180.3 cm (70.98\").    Weight as of this encounter: 85.3 kg (188 lb).               Physical Exam  Vitals reviewed.   Constitutional:       General: He is not in acute distress.     Appearance: He is overweight. He is not ill-appearing.   Cardiovascular:      Rate and Rhythm: Normal rate and regular rhythm.   Pulmonary:      Effort: Pulmonary effort is normal.      Breath sounds: Normal breath sounds.   Neurological:      Mental Status: He is alert.      Result Review :  The following data was reviewed by: Sunita Servin MD on 08/30/2023:  Common labs          3/8/2023    08:10 3/8/2023    08:34 5/19/2023    09:45 8/30/2023    07:52   Common Labs   Glucose  138      BUN  16      Creatinine  1.04      Sodium  137      Potassium  4.2      Chloride  102      Calcium  9.3      Total Protein  7.6      Albumin  5.2      Total Bilirubin  0.6      Alkaline Phosphatase  68      AST (SGOT)  24      ALT (SGPT)  40      WBC  6.00      Hemoglobin  15.2      Hematocrit  43.8      Platelets  205      Total Cholesterol  212      Triglycerides  98      HDL Cholesterol  39      LDL Cholesterol   155      Hemoglobin " A1C 6.9   6.40  6.7    PSA  1.340        A1C Last 3 Results          3/8/2023    08:10 5/19/2023    09:45 8/30/2023    07:52   HGBA1C Last 3 Results   Hemoglobin A1C 6.9  6.40  6.7          POCT SARS-CoV-2 Antigen JORDAN (08/23/2023 09:46)          Assessment and Plan   Diagnoses and all orders for this visit:    1. Type 2 diabetes mellitus with hyperglycemia, without long-term current use of insulin (Primary)  -     POC Glycosylated Hemoglobin (Hb A1C)  Uncontrolled.  Increase metformin  mg to 2 pills daily.  Recommend low-carb diet.  Recheck in 3 months.  2. Primary hypertension  -     lisinopril (PRINIVIL,ZESTRIL) 2.5 MG tablet; Take 1 tablet by mouth Daily.  Dispense: 90 tablet; Refill: 1  Controlled.  Tolerating without side effects.  Continue lisinopril 2.5 mg.  3. Overweight with body mass index (BMI) of 26 to 26.9 in adult  BMI is >= 25 and <30. (Overweight) The following options were offered after discussion;: nutrition counseling/recommendations  Recommend low-carb diet.           Follow Up   Return in about 3 months (around 11/30/2023) for Office visit diabetes.  Patient was given instructions and counseling regarding his condition or for health maintenance advice. Please see specific information pulled into the AVS if appropriate.       Electronically signed by Sunita Servin MD, 08/30/23, 7:58 AM EDT.

## 2023-09-22 RX ORDER — METFORMIN HYDROCHLORIDE 500 MG/1
1000 TABLET, EXTENDED RELEASE ORAL
Qty: 180 TABLET | Refills: 1 | Status: SHIPPED | OUTPATIENT
Start: 2023-09-22

## 2023-09-22 NOTE — TELEPHONE ENCOUNTER
Patient comment: Dr Gordon said she was going to increase the dosage to 1000mg.     Rx Refill Note  Requested Prescriptions     Pending Prescriptions Disp Refills    metFORMIN ER (GLUCOPHAGE-XR) 500 MG 24 hr tablet       Sig: Take 2 tablets by mouth Daily With Breakfast.      Last office visit with prescribing clinician: 8/30/2023   Last telemedicine visit with prescribing clinician: Visit date not found   Next office visit with prescribing clinician: 11/30/2023                         Would you like a call back once the refill request has been completed: [] Yes [] No    If the office needs to give you a call back, can they leave a voicemail: [] Yes [] No    Kaity Odom MA  09/22/23, 07:38 EDT

## 2023-10-26 DIAGNOSIS — N13.8 BPH WITH OBSTRUCTION/LOWER URINARY TRACT SYMPTOMS: ICD-10-CM

## 2023-10-26 DIAGNOSIS — N40.1 BPH WITH OBSTRUCTION/LOWER URINARY TRACT SYMPTOMS: ICD-10-CM

## 2023-10-26 NOTE — TELEPHONE ENCOUNTER
Rx Refill Note  Requested Prescriptions     Pending Prescriptions Disp Refills    tamsulosin (FLOMAX) 0.4 MG capsule 24 hr capsule [Pharmacy Med Name: TAMSULOSIN HCL 0.4 MG CAPSULE] 30 capsule 2     Sig: TAKE 1 CAPSULE BY MOUTH DAILY      Last office visit with prescribing clinician: 8/30/2023   Last telemedicine visit with prescribing clinician: Visit date not found   Next office visit with prescribing clinician: 11/30/2023       Zakiya Campuzano MA  10/26/23, 14:36 EDT    Chart states per patient he is not taking this

## 2023-10-27 RX ORDER — TAMSULOSIN HYDROCHLORIDE 0.4 MG/1
1 CAPSULE ORAL DAILY
Qty: 30 CAPSULE | Refills: 2 | Status: SHIPPED | OUTPATIENT
Start: 2023-10-27

## 2023-10-27 NOTE — TELEPHONE ENCOUNTER
Rx Refill Note  Requested Prescriptions     Pending Prescriptions Disp Refills    tamsulosin (FLOMAX) 0.4 MG capsule 24 hr capsule [Pharmacy Med Name: TAMSULOSIN HCL 0.4 MG CAPSULE] 30 capsule 2     Sig: TAKE 1 CAPSULE BY MOUTH DAILY      Last office visit with prescribing clinician: 8/30/2023   Last telemedicine visit with prescribing clinician: Visit date not found   Next office visit with prescribing clinician: 11/30/2023                         Would you like a call back once the refill request has been completed: [] Yes [] No    If the office needs to give you a call back, can they leave a voicemail: [] Yes [] No    Kaity Odom MA  10/27/23, 11:15 EDT

## 2024-02-03 DIAGNOSIS — N40.1 BPH WITH OBSTRUCTION/LOWER URINARY TRACT SYMPTOMS: ICD-10-CM

## 2024-02-03 DIAGNOSIS — N13.8 BPH WITH OBSTRUCTION/LOWER URINARY TRACT SYMPTOMS: ICD-10-CM

## 2024-02-05 RX ORDER — TAMSULOSIN HYDROCHLORIDE 0.4 MG/1
1 CAPSULE ORAL DAILY
Qty: 90 CAPSULE | OUTPATIENT
Start: 2024-02-05

## 2024-02-05 NOTE — TELEPHONE ENCOUNTER
Rx Refill Note  Requested Prescriptions     Pending Prescriptions Disp Refills    tamsulosin (FLOMAX) 0.4 MG capsule 24 hr capsule [Pharmacy Med Name: TAMSULOSIN HCL 0.4 MG CAPSULE] 90 capsule      Sig: TAKE 1 CAPSULE BY MOUTH DAILY      Last office visit with prescribing clinician: 8/30/2023   Last telemedicine visit with prescribing clinician: Visit date not found   Next office visit with prescribing clinician: Visit date not found     Marialuisa Ordonez Rep  02/05/24, 08:27 EST    Called patient to schedule follow up, patient is changing providers. Rx declined as patient has an appointment with new provider on Thursday 02/08/2024.

## 2024-04-17 ENCOUNTER — TRANSCRIBE ORDERS (OUTPATIENT)
Dept: PHYSICAL THERAPY | Facility: CLINIC | Age: 64
End: 2024-04-17
Payer: COMMERCIAL

## 2024-04-17 DIAGNOSIS — R49.0 DYSPHONIA: ICD-10-CM

## 2024-04-17 DIAGNOSIS — R49.0 CHRONIC HOARSENESS: Primary | ICD-10-CM

## 2024-04-29 ENCOUNTER — OFFICE VISIT (OUTPATIENT)
Dept: PHYSICAL THERAPY | Facility: CLINIC | Age: 64
End: 2024-04-29
Payer: COMMERCIAL

## 2024-04-29 DIAGNOSIS — R49.0 CHRONIC HOARSENESS: ICD-10-CM

## 2024-04-29 DIAGNOSIS — R49.0 DYSPHONIA: Primary | ICD-10-CM

## 2024-04-29 PROCEDURE — 92524 BEHAVRAL QUALIT ANALYS VOICE: CPT | Performed by: SPEECH-LANGUAGE PATHOLOGIST

## 2024-04-29 NOTE — PROGRESS NOTES
Outpatient Speech Language Pathology   Adult Speech Language Cognitive Initial Evaluation  [address]       Patient Name: Riaz Luevano  : 1960  MRN: [id number]  Today's Date: 2024        Visit Date: 2024   Patient Active Problem List   Diagnosis    Right bundle branch block (RBBB)    Vitamin D deficiency    Diabetes mellitus    Dyslipidemia, goal LDL below 100    BPH with obstruction/lower urinary tract symptoms    Dyshidrotic eczema    Primary hypertension        Past Medical History:   Diagnosis Date    Allergic rhinitis     Arthritis     BPH (benign prostatic hyperplasia)     Colon polyp 2017    tubular adenoma    COVID-19 2022    Degenerative joint disease     Diabetes mellitus     Diverticulosis     Elevated liver enzymes     fibrosure high    Fractures     ankle    GERD (gastroesophageal reflux disease)     Heart murmur     As a child    Hereditary hemochromatosis 2017    carrier    History of cardiovascular stress test 2022    Normal stress echo with no significant echocardiographic evidence for myocardial ischemia    Hyperlipidemia     Hypertension     Kidney stone     Proctalgia fugax     Pruritus ani     Rotator cuff disorder 2014    Testicular hypofunction     Vitamin D deficiency         Past Surgical History:   Procedure Laterality Date    ANKLE SURGERY      Right    COLONOSCOPY      DENTAL PROCEDURE  2020    FRACTURE SURGERY  1997    KIDNEY STONE SURGERY           Visit Dx:    ICD-10-CM ICD-9-CM   1. Dysphonia  R49.0 784.42            OP SLP Assessment/Plan - 24 0800          SLP Assessment    Functional Problems Voice  -RB    Impact on Function- Voice Difficulty communicating;Difficulty communicating in an emergency;Restrictions in personal and social life  -RB    Clinical Impression- Voice Mild voice disorder  -RB    Functional Problems Comment impacts communication, activities, work tasks  -RB    Clinical Impression  Comments would benefit from skilled ST  -RB    Please refer to paper survey for additional self-reported information Yes  -RB    Please refer to items scanned into chart for additional diagnostic informaiton and handouts as provided by clinician Yes  -RB    SLP Diagnosis voice  -RB    Prognosis Good (comment)  -RB    Patient/caregiver participated in establishment of treatment plan and goals Yes  -RB    Patient would benefit from skilled therapy intervention Yes  -RB       SLP Plan    Frequency 1x/weekly  -RB    Duration 8 weeks  -RB    Planned CPT's? SLP INDIVIDUAL SPEECH THERAPY: 69484  -RB    Expected Duration of Therapy Session (SLP Eval) 45  -RB    Plan Comments initiate POC  -RB              User Key  (r) = Recorded By, (t) = Taken By, (c) = Cosigned By      Initials Name Provider Type    RB Deirdre Main, SLP Speech and Language Pathologist                     SLP SLC Evaluation - 04/29/24 0800          Communication Assessment/Intervention    Document Type evaluation  -RB    Total Evaluation Minutes, SLP 35  -RB    Subjective Information no complaints  -RB    Patient Observations alert;cooperative;agree to therapy  -RB    Patient Effort good  -RB    Symptoms Noted During/After Treatment none  -RB       General Information    Patient Profile Reviewed yes  -RB    Pertinent History Of Current Problem PMH:reflux. Pt notes several years of having a weak voice and a hoarse voice that can come and go. He notes increased strain and effort to produce voice. He was a  and noted he would occasionally strain his voice in this role. He went to Dr. Grayson who scoped him and noted inflammation likely due to reflux, but no masses or VC abnormalities and WNL movement. He started omeprazole and was on it for 30 days, but when it ran out did not get refilled right away. He recently restarted about a week ago. 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 He leads  "trainings and talks to employees and notes his voice is occasionally tired at the end of the day.  -RB    Precautions/Limitations, Vision WFL with corrective lenses;for purposes of eval  -RB    Precautions/Limitations, Hearing WFL;for purposes of eval;other (see comments)   pt notes he should probably get his hearing tested -RB    Prior Level of Function-Communication WFL  -RB    Plans/Goals Discussed with patient;agreed upon  -RB    Barriers to Rehab none identified  -RB    Patient's Goals for Discharge functional communication  -RB       Oral Motor Structure and Function    Oral Motor Structure and Function WFL  -RB       Oral Musculature and Cranial Nerve Assessment    Oral Motor General Assessment WFL  -RB              User Key  (r) = Recorded By, (t) = Taken By, (c) = Cosigned By      Initials Name Provider Type    Deirdre Bradley SLP Speech and Language Pathologist                        Voice - 04/29/24 0800          General Voice History    Reflux History Other (comment)   started omeprazole at ENT recommendation -RB    Alcohol Servings 0  -RB    Daily Water Intake 3 16 oz bottles  -RB    Daily Caffeine Intake 16 oz coffee a day, occasionally tea  -RB    Tobacco History never smoker  -RB    Pulmonary Status --   no -RB    Environmental Factors Exposure to allergens  -RB    Typical Voice Use Uses voice for ADL's;Vocation depends on voice use (comment);Casual musa  -RB    Symptoms Improved/Worsened By --   \"hit or miss\" when it happens -RB    Vocal Abuse/Misuse Talking excessive number of hours;Other (comment)   some coughing -RB       Voice Assessment    S/Z Ratio and Interpretation s: 11 secs, z: 10 secs  -RB    Maximum Phonation Time and Interpretation 15 secs, below normal  -RB    Pitch Glide Characteristics WFL  -RB    Pitch Range During Speech WFL  -RB    Voice Onset WFL  -RB    Voice Features Observed Hoarseness;Other (comment)   roughness, weakness -RB    Resonance Characteristics Back Focus  -RB "    Muscle Tension Observation Jaw (comment);Neck (comment);Shoulder (comment)  -RB    Breath Support- At Rest Mixed  -RB    Breath Support- During Sustained Phonation Mixed  -RB    Breath Support- During Conversation Mixed  -RB       Measures and Scales for Voice    Measures and Scales for Voice CAPE-V;Voice Handicap Index  -RB       Voice Handicap Index    Functional Subtest Score 9  -RB    Physical Subtest Score 17  -RB    Emotional Subtest Score 5  -RB    Total Score 31- moderate rating, see scan for full report  -RB       CAPE-V    Overall Severity Mildly deviant  -RB    Overall Severity Score 5  -RB    Roughness Mildly deviant  -RB    Roughness Score 5  -RB    Breathiness WNL  -RB    Breathiness Score 0  -RB    Strain Mildly deviant  -RB    Strain Score 5  -RB    Pitch WNL  -RB    Pitch Score 0  -RB    Loudness WNL  -RB    Loudness Score 0  -RB    Instability WNL  -RB    Pitch Instability Score 0  -RB    Resonance WNL  -RB    Resonance Score 0  -RB              User Key  (r) = Recorded By, (t) = Taken By, (c) = Cosigned By      Initials Name Provider Type    Deirdre Bradley, SLP Speech and Language Pathologist                      ACTIVITY  ACCURACY ASSISTANCE NEEDED   LTGs:   Pt will be able to engage in speech at the conversational level in all settings, using functional phonation acceptable habitual pitch and balanced tone focus at 95% no cues        Pt will be able to use voice in vocational and avocational activities without functional limitations due to hoarseness at 95% no cue              STG:  Patient will reduce hyperfunctional use of voice by performing Vocal Function Exercises at 90% no cues                 STG:     Patient will reduce hyperfunctional use of the vocal mechanism via improved use of diaphragmatic breathing at 95% no cues                 STG:  PT will independently demonstrate SOVTE with no cues at 100%      STG:   PT will independently demonstrate resonant voice exercises at 100% no  cues          Certification: 4/29/24-7/28/24           OP SLP Education       Row Name 04/29/24 0800       Education    Barriers to Learning No barriers identified  -RB    Education Provided Described results of evaluation;Patient expressed understanding of evaluation;Patient participated in establishing goals and treatment plan;Patient demonstrated recommended strategies;Patient requires further education on strategies, risks  -RB    Assessed Learning needs;Learning motivation;Learning preferences;Learning readiness  -RB    Learning Motivation Strong  -RB    Learning Method Explanation;Demonstration;Teach back;Written materials  -RB    Teaching Response Verbalized understanding;Demonstrated understanding;Reinforcement needed  -RB    Education Comments HEP: resonant voice, SOVT, VFE  -RB              User Key  (r) = Recorded By, (t) = Taken By, (c) = Cosigned By      Initials Name Effective Dates    Deirdre Bradley, ALYSA 02/12/24 -                            [facility] Speech Language Pathology   [address]  [place] [address]  Deirdre Main MA CCC-SLP [facility] license: [id number]        PHYSICIAN: Jose Grayson MD    NPI: [id number]         I certify that the therapy services are furnished while this patient is under my care.  The services outlined above are required by this patient, and will be reviewed every 90 days.                PHYSICIAN:                                         DATE:      Please sign and return via fax to [phone].   Thank you,   [facility].   4/29/2024